# Patient Record
Sex: FEMALE | Race: WHITE | NOT HISPANIC OR LATINO | Employment: OTHER | URBAN - METROPOLITAN AREA
[De-identification: names, ages, dates, MRNs, and addresses within clinical notes are randomized per-mention and may not be internally consistent; named-entity substitution may affect disease eponyms.]

---

## 2018-02-23 ENCOUNTER — OFFICE VISIT CONVERTED (OUTPATIENT)
Dept: FAMILY MEDICINE CLINIC | Facility: CLINIC | Age: 53
End: 2018-02-23
Attending: FAMILY MEDICINE

## 2019-08-21 ENCOUNTER — HOSPITAL ENCOUNTER (OUTPATIENT)
Dept: URGENT CARE | Facility: CLINIC | Age: 54
Discharge: HOME | End: 2019-08-21
Attending: EMERGENCY MEDICINE

## 2021-05-16 VITALS
DIASTOLIC BLOOD PRESSURE: 56 MMHG | SYSTOLIC BLOOD PRESSURE: 109 MMHG | HEIGHT: 64 IN | BODY MASS INDEX: 26.64 KG/M2 | HEART RATE: 79 BPM | WEIGHT: 156.06 LBS | OXYGEN SATURATION: 98 % | TEMPERATURE: 97.5 F

## 2021-07-02 RX ORDER — LEVOTHYROXINE SODIUM 0.1 MG/1
TABLET ORAL
Qty: 30 TABLET | Refills: 2 | Status: SHIPPED | OUTPATIENT
Start: 2021-07-02 | End: 2021-10-05

## 2021-08-14 ENCOUNTER — PREP FOR SURGERY (OUTPATIENT)
Dept: OTHER | Facility: HOSPITAL | Age: 56
End: 2021-08-14

## 2021-08-14 ENCOUNTER — ANESTHESIA (OUTPATIENT)
Dept: PERIOP | Facility: HOSPITAL | Age: 56
End: 2021-08-14

## 2021-08-14 ENCOUNTER — APPOINTMENT (OUTPATIENT)
Dept: CT IMAGING | Facility: HOSPITAL | Age: 56
End: 2021-08-14

## 2021-08-14 ENCOUNTER — HOSPITAL ENCOUNTER (OUTPATIENT)
Facility: HOSPITAL | Age: 56
Discharge: HOME | End: 2021-08-15
Attending: EMERGENCY MEDICINE | Admitting: SURGERY

## 2021-08-14 ENCOUNTER — ANESTHESIA EVENT (OUTPATIENT)
Dept: PERIOP | Facility: HOSPITAL | Age: 56
End: 2021-08-14

## 2021-08-14 VITALS
TEMPERATURE: 97.7 F | RESPIRATION RATE: 16 BRPM | HEART RATE: 66 BPM | OXYGEN SATURATION: 93 % | WEIGHT: 155.65 LBS | HEIGHT: 64 IN | BODY MASS INDEX: 26.57 KG/M2 | SYSTOLIC BLOOD PRESSURE: 107 MMHG | DIASTOLIC BLOOD PRESSURE: 61 MMHG

## 2021-08-14 DIAGNOSIS — K35.30 ACUTE APPENDICITIS WITH LOCALIZED PERITONITIS, WITHOUT PERFORATION, ABSCESS, OR GANGRENE: Primary | ICD-10-CM

## 2021-08-14 DIAGNOSIS — K35.80 ACUTE APPENDICITIS, UNSPECIFIED ACUTE APPENDICITIS TYPE: Primary | ICD-10-CM

## 2021-08-14 LAB
ALBUMIN SERPL-MCNC: 4.8 G/DL (ref 3.5–5.2)
ALBUMIN/GLOB SERPL: 1.5 G/DL
ALP SERPL-CCNC: 66 U/L (ref 39–117)
ALT SERPL W P-5'-P-CCNC: 30 U/L (ref 1–33)
ANION GAP SERPL CALCULATED.3IONS-SCNC: 12.3 MMOL/L (ref 5–15)
AST SERPL-CCNC: 16 U/L (ref 1–32)
BACTERIA UR QL AUTO: ABNORMAL /HPF
BASOPHILS # BLD AUTO: 0.11 10*3/MM3 (ref 0–0.2)
BASOPHILS NFR BLD AUTO: 0.5 % (ref 0–1.5)
BILIRUB SERPL-MCNC: 0.4 MG/DL (ref 0–1.2)
BILIRUB UR QL STRIP: NEGATIVE
BUN SERPL-MCNC: 10 MG/DL (ref 6–20)
BUN/CREAT SERPL: 11.9 (ref 7–25)
CALCIUM SPEC-SCNC: 9.7 MG/DL (ref 8.6–10.5)
CHLORIDE SERPL-SCNC: 100 MMOL/L (ref 98–107)
CLARITY UR: CLEAR
CO2 SERPL-SCNC: 24.7 MMOL/L (ref 22–29)
COLOR UR: YELLOW
CREAT SERPL-MCNC: 0.84 MG/DL (ref 0.57–1)
DEPRECATED RDW RBC AUTO: 43.8 FL (ref 37–54)
EOSINOPHIL # BLD AUTO: 0.11 10*3/MM3 (ref 0–0.4)
EOSINOPHIL NFR BLD AUTO: 0.5 % (ref 0.3–6.2)
ERYTHROCYTE [DISTWIDTH] IN BLOOD BY AUTOMATED COUNT: 13.2 % (ref 12.3–15.4)
GFR SERPL CREATININE-BSD FRML MDRD: 70 ML/MIN/1.73
GLOBULIN UR ELPH-MCNC: 3.1 GM/DL
GLUCOSE SERPL-MCNC: 137 MG/DL (ref 65–99)
GLUCOSE UR STRIP-MCNC: NEGATIVE MG/DL
HCT VFR BLD AUTO: 46.1 % (ref 34–46.6)
HGB BLD-MCNC: 15.2 G/DL (ref 12–15.9)
HGB UR QL STRIP.AUTO: ABNORMAL
HOLD SPECIMEN: NORMAL
HOLD SPECIMEN: NORMAL
IMM GRANULOCYTES # BLD AUTO: 0.16 10*3/MM3 (ref 0–0.05)
IMM GRANULOCYTES NFR BLD AUTO: 0.7 % (ref 0–0.5)
KETONES UR QL STRIP: NEGATIVE
LEUKOCYTE ESTERASE UR QL STRIP.AUTO: NEGATIVE
LIPASE SERPL-CCNC: 20 U/L (ref 13–60)
LYMPHOCYTES # BLD AUTO: 2.16 10*3/MM3 (ref 0.7–3.1)
LYMPHOCYTES NFR BLD AUTO: 8.9 % (ref 19.6–45.3)
MCH RBC QN AUTO: 29.9 PG (ref 26.6–33)
MCHC RBC AUTO-ENTMCNC: 33 G/DL (ref 31.5–35.7)
MCV RBC AUTO: 90.6 FL (ref 79–97)
MONOCYTES # BLD AUTO: 1.74 10*3/MM3 (ref 0.1–0.9)
MONOCYTES NFR BLD AUTO: 7.2 % (ref 5–12)
MUCOUS THREADS URNS QL MICRO: ABNORMAL /HPF
NEUTROPHILS NFR BLD AUTO: 19.94 10*3/MM3 (ref 1.7–7)
NEUTROPHILS NFR BLD AUTO: 82.2 % (ref 42.7–76)
NITRITE UR QL STRIP: NEGATIVE
NRBC BLD AUTO-RTO: 0 /100 WBC (ref 0–0.2)
PH UR STRIP.AUTO: 5.5 [PH] (ref 5–8)
PLATELET # BLD AUTO: 301 10*3/MM3 (ref 140–450)
PMV BLD AUTO: 9.9 FL (ref 6–12)
POTASSIUM SERPL-SCNC: 4.5 MMOL/L (ref 3.5–5.2)
PROT SERPL-MCNC: 7.9 G/DL (ref 6–8.5)
PROT UR QL STRIP: ABNORMAL
RBC # BLD AUTO: 5.09 10*6/MM3 (ref 3.77–5.28)
RBC # UR: ABNORMAL /HPF
REF LAB TEST METHOD: ABNORMAL
SODIUM SERPL-SCNC: 137 MMOL/L (ref 136–145)
SP GR UR STRIP: 1.02 (ref 1–1.03)
SQUAMOUS #/AREA URNS HPF: ABNORMAL /HPF
UROBILINOGEN UR QL STRIP: ABNORMAL
WBC # BLD AUTO: 24.22 10*3/MM3 (ref 3.4–10.8)
WBC UR QL AUTO: ABNORMAL /HPF
WHOLE BLOOD HOLD SPECIMEN: NORMAL

## 2021-08-14 PROCEDURE — 85025 COMPLETE CBC W/AUTO DIFF WBC: CPT

## 2021-08-14 PROCEDURE — 99203 OFFICE O/P NEW LOW 30 MIN: CPT | Performed by: SURGERY

## 2021-08-14 PROCEDURE — 96365 THER/PROPH/DIAG IV INF INIT: CPT

## 2021-08-14 PROCEDURE — 88304 TISSUE EXAM BY PATHOLOGIST: CPT | Performed by: SURGERY

## 2021-08-14 PROCEDURE — 25010000002 PROPOFOL 10 MG/ML EMULSION: Performed by: NURSE ANESTHETIST, CERTIFIED REGISTERED

## 2021-08-14 PROCEDURE — 25010000002 DEXAMETHASONE PER 1 MG: Performed by: NURSE ANESTHETIST, CERTIFIED REGISTERED

## 2021-08-14 PROCEDURE — C1889 IMPLANT/INSERT DEVICE, NOC: HCPCS | Performed by: SURGERY

## 2021-08-14 PROCEDURE — 83690 ASSAY OF LIPASE: CPT

## 2021-08-14 PROCEDURE — 25010000002 KETOROLAC TROMETHAMINE PER 15 MG: Performed by: NURSE ANESTHETIST, CERTIFIED REGISTERED

## 2021-08-14 PROCEDURE — 36415 COLL VENOUS BLD VENIPUNCTURE: CPT

## 2021-08-14 PROCEDURE — 25010000002 PIPERACILLIN SOD-TAZOBACTAM PER 1 G: Performed by: NURSE PRACTITIONER

## 2021-08-14 PROCEDURE — 25010000002 ONDANSETRON PER 1 MG: Performed by: NURSE PRACTITIONER

## 2021-08-14 PROCEDURE — 25010000002 KETOROLAC TROMETHAMINE PER 15 MG: Performed by: NURSE PRACTITIONER

## 2021-08-14 PROCEDURE — 74176 CT ABD & PELVIS W/O CONTRAST: CPT

## 2021-08-14 PROCEDURE — 25010000002 ONDANSETRON PER 1 MG: Performed by: NURSE ANESTHETIST, CERTIFIED REGISTERED

## 2021-08-14 PROCEDURE — 96375 TX/PRO/DX INJ NEW DRUG ADDON: CPT

## 2021-08-14 PROCEDURE — 25010000002 DEXAMETHASONE PER 1 MG

## 2021-08-14 PROCEDURE — 99284 EMERGENCY DEPT VISIT MOD MDM: CPT

## 2021-08-14 PROCEDURE — 25010000002 FENTANYL CITRATE (PF) 50 MCG/ML SOLUTION: Performed by: NURSE ANESTHETIST, CERTIFIED REGISTERED

## 2021-08-14 PROCEDURE — 81001 URINALYSIS AUTO W/SCOPE: CPT

## 2021-08-14 PROCEDURE — 80053 COMPREHEN METABOLIC PANEL: CPT

## 2021-08-14 PROCEDURE — 25010000002 BUPRENORPHINE PER 0.1 MG

## 2021-08-14 PROCEDURE — 44970 LAPAROSCOPY APPENDECTOMY: CPT | Performed by: SURGERY

## 2021-08-14 DEVICE — ENDOPATH ECHELON ENDOSCOPIC LINEAR CUTTER RELOADS, BLUE, 60MM
Type: IMPLANTABLE DEVICE | Site: ABDOMEN | Status: FUNCTIONAL
Brand: ECHELON ENDOPATH

## 2021-08-14 RX ORDER — POLYETHYLENE GLYCOL 3350 17 G/17G
17 POWDER, FOR SOLUTION ORAL DAILY
Qty: 5 PACKET | Refills: 0 | Status: SHIPPED | OUTPATIENT
Start: 2021-08-14 | End: 2022-05-10

## 2021-08-14 RX ORDER — ONDANSETRON 2 MG/ML
4 INJECTION INTRAMUSCULAR; INTRAVENOUS ONCE AS NEEDED
Status: DISCONTINUED | OUTPATIENT
Start: 2021-08-14 | End: 2021-08-15 | Stop reason: HOSPADM

## 2021-08-14 RX ORDER — ROCURONIUM BROMIDE 10 MG/ML
INJECTION, SOLUTION INTRAVENOUS AS NEEDED
Status: DISCONTINUED | OUTPATIENT
Start: 2021-08-14 | End: 2021-08-14 | Stop reason: SURG

## 2021-08-14 RX ORDER — KETOROLAC TROMETHAMINE 30 MG/ML
30 INJECTION, SOLUTION INTRAMUSCULAR; INTRAVENOUS ONCE
Status: COMPLETED | OUTPATIENT
Start: 2021-08-14 | End: 2021-08-14

## 2021-08-14 RX ORDER — PROMETHAZINE HYDROCHLORIDE 25 MG/1
25 SUPPOSITORY RECTAL ONCE AS NEEDED
Status: DISCONTINUED | OUTPATIENT
Start: 2021-08-14 | End: 2021-08-15 | Stop reason: HOSPADM

## 2021-08-14 RX ORDER — ACETAMINOPHEN 325 MG/1
650 TABLET ORAL EVERY 6 HOURS PRN
Status: DISCONTINUED | OUTPATIENT
Start: 2021-08-14 | End: 2021-08-15 | Stop reason: HOSPADM

## 2021-08-14 RX ORDER — MEPERIDINE HYDROCHLORIDE 25 MG/ML
12.5 INJECTION INTRAMUSCULAR; INTRAVENOUS; SUBCUTANEOUS
Status: DISCONTINUED | OUTPATIENT
Start: 2021-08-14 | End: 2021-08-15 | Stop reason: HOSPADM

## 2021-08-14 RX ORDER — PHENYLEPHRINE HCL IN 0.9% NACL 1 MG/10 ML
SYRINGE (ML) INTRAVENOUS AS NEEDED
Status: DISCONTINUED | OUTPATIENT
Start: 2021-08-14 | End: 2021-08-14 | Stop reason: SURG

## 2021-08-14 RX ORDER — KETOROLAC TROMETHAMINE 15 MG/ML
10 INJECTION, SOLUTION INTRAMUSCULAR; INTRAVENOUS ONCE
Status: DISCONTINUED | OUTPATIENT
Start: 2021-08-14 | End: 2021-08-14

## 2021-08-14 RX ORDER — PROMETHAZINE HYDROCHLORIDE 25 MG/1
25 TABLET ORAL ONCE AS NEEDED
Status: DISCONTINUED | OUTPATIENT
Start: 2021-08-14 | End: 2021-08-15 | Stop reason: HOSPADM

## 2021-08-14 RX ORDER — SODIUM CHLORIDE 0.9 % (FLUSH) 0.9 %
10 SYRINGE (ML) INJECTION AS NEEDED
Status: DISCONTINUED | OUTPATIENT
Start: 2021-08-14 | End: 2021-08-15 | Stop reason: HOSPADM

## 2021-08-14 RX ORDER — SODIUM CHLORIDE 0.9 % (FLUSH) 0.9 %
10 SYRINGE (ML) INJECTION EVERY 12 HOURS SCHEDULED
Status: DISCONTINUED | OUTPATIENT
Start: 2021-08-14 | End: 2021-08-15 | Stop reason: HOSPADM

## 2021-08-14 RX ORDER — OXYCODONE HYDROCHLORIDE AND ACETAMINOPHEN 5; 325 MG/1; MG/1
1 TABLET ORAL EVERY 6 HOURS PRN
Qty: 12 TABLET | Refills: 0 | Status: SHIPPED | OUTPATIENT
Start: 2021-08-14 | End: 2022-05-10

## 2021-08-14 RX ORDER — ONDANSETRON 2 MG/ML
4 INJECTION INTRAMUSCULAR; INTRAVENOUS ONCE
Status: COMPLETED | OUTPATIENT
Start: 2021-08-14 | End: 2021-08-14

## 2021-08-14 RX ORDER — LIDOCAINE HYDROCHLORIDE 20 MG/ML
INJECTION, SOLUTION INFILTRATION; PERINEURAL AS NEEDED
Status: DISCONTINUED | OUTPATIENT
Start: 2021-08-14 | End: 2021-08-14 | Stop reason: SURG

## 2021-08-14 RX ORDER — KETOROLAC TROMETHAMINE 30 MG/ML
INJECTION, SOLUTION INTRAMUSCULAR; INTRAVENOUS AS NEEDED
Status: DISCONTINUED | OUTPATIENT
Start: 2021-08-14 | End: 2021-08-14 | Stop reason: SURG

## 2021-08-14 RX ORDER — ONDANSETRON 2 MG/ML
INJECTION INTRAMUSCULAR; INTRAVENOUS AS NEEDED
Status: DISCONTINUED | OUTPATIENT
Start: 2021-08-14 | End: 2021-08-14 | Stop reason: SURG

## 2021-08-14 RX ORDER — DEXAMETHASONE SODIUM PHOSPHATE 4 MG/ML
INJECTION, SOLUTION INTRA-ARTICULAR; INTRALESIONAL; INTRAMUSCULAR; INTRAVENOUS; SOFT TISSUE AS NEEDED
Status: DISCONTINUED | OUTPATIENT
Start: 2021-08-14 | End: 2021-08-14 | Stop reason: SURG

## 2021-08-14 RX ORDER — PROPOFOL 10 MG/ML
VIAL (ML) INTRAVENOUS AS NEEDED
Status: DISCONTINUED | OUTPATIENT
Start: 2021-08-14 | End: 2021-08-14 | Stop reason: SURG

## 2021-08-14 RX ORDER — OXYCODONE HYDROCHLORIDE 5 MG/1
5 TABLET ORAL
Status: DISCONTINUED | OUTPATIENT
Start: 2021-08-14 | End: 2021-08-15 | Stop reason: HOSPADM

## 2021-08-14 RX ORDER — SODIUM CHLORIDE, SODIUM LACTATE, POTASSIUM CHLORIDE, CALCIUM CHLORIDE 600; 310; 30; 20 MG/100ML; MG/100ML; MG/100ML; MG/100ML
50 INJECTION, SOLUTION INTRAVENOUS CONTINUOUS
Status: DISCONTINUED | OUTPATIENT
Start: 2021-08-14 | End: 2021-08-15 | Stop reason: HOSPADM

## 2021-08-14 RX ORDER — FENTANYL CITRATE 50 UG/ML
INJECTION, SOLUTION INTRAMUSCULAR; INTRAVENOUS AS NEEDED
Status: DISCONTINUED | OUTPATIENT
Start: 2021-08-14 | End: 2021-08-14 | Stop reason: SURG

## 2021-08-14 RX ADMIN — SUGAMMADEX 150 MG: 100 INJECTION, SOLUTION INTRAVENOUS at 22:27

## 2021-08-14 RX ADMIN — TAZOBACTAM SODIUM AND PIPERACILLIN SODIUM 3.38 G: 375; 3 INJECTION, SOLUTION INTRAVENOUS at 20:25

## 2021-08-14 RX ADMIN — SODIUM CHLORIDE, POTASSIUM CHLORIDE, SODIUM LACTATE AND CALCIUM CHLORIDE: 600; 310; 30; 20 INJECTION, SOLUTION INTRAVENOUS at 21:39

## 2021-08-14 RX ADMIN — ROCURONIUM BROMIDE 50 MG: 10 INJECTION INTRAVENOUS at 21:40

## 2021-08-14 RX ADMIN — KETOROLAC TROMETHAMINE 30 MG: 30 INJECTION, SOLUTION INTRAMUSCULAR; INTRAVENOUS at 17:35

## 2021-08-14 RX ADMIN — LIDOCAINE HYDROCHLORIDE 50 MG: 20 INJECTION, SOLUTION INFILTRATION; PERINEURAL at 21:40

## 2021-08-14 RX ADMIN — Medication 100 MCG: at 21:56

## 2021-08-14 RX ADMIN — DEXAMETHASONE SODIUM PHOSPHATE 4 MG: 4 INJECTION INTRA-ARTICULAR; INTRALESIONAL; INTRAMUSCULAR; INTRAVENOUS; SOFT TISSUE at 21:40

## 2021-08-14 RX ADMIN — KETOROLAC TROMETHAMINE 30 MG: 30 INJECTION, SOLUTION INTRAMUSCULAR; INTRAVENOUS at 22:24

## 2021-08-14 RX ADMIN — ONDANSETRON 4 MG: 2 INJECTION INTRAMUSCULAR; INTRAVENOUS at 22:24

## 2021-08-14 RX ADMIN — SODIUM CHLORIDE 1000 ML: 9 INJECTION, SOLUTION INTRAVENOUS at 19:48

## 2021-08-14 RX ADMIN — PROPOFOL 150 MG: 10 INJECTION, EMULSION INTRAVENOUS at 21:40

## 2021-08-14 RX ADMIN — FENTANYL CITRATE 100 MCG: 50 INJECTION INTRAMUSCULAR; INTRAVENOUS at 21:40

## 2021-08-14 RX ADMIN — ONDANSETRON 4 MG: 2 INJECTION INTRAMUSCULAR; INTRAVENOUS at 17:35

## 2021-08-14 RX ADMIN — ACETAMINOPHEN 650 MG: 325 TABLET ORAL at 19:46

## 2021-08-14 NOTE — ED PROVIDER NOTES
Subjective   Patient reports right lower quadrant abdominal pain, nausea, vomiting, and one episode of diarrhea that started last night and has persisted throughout today. She is unable to eat and has only been able to drink a small amount of water today.      History provided by:  Patient, spouse and relative   used: No        Review of Systems   Constitutional: Positive for appetite change. Negative for chills and fever.   HENT: Negative for congestion, ear pain and sore throat.    Eyes: Negative for pain.   Respiratory: Negative for cough, chest tightness and shortness of breath.    Cardiovascular: Negative for chest pain.   Gastrointestinal: Positive for abdominal pain (RLQ), diarrhea, nausea and vomiting.   Genitourinary: Negative for flank pain and hematuria.   Musculoskeletal: Negative for joint swelling.   Skin: Negative for pallor.   Neurological: Negative for seizures and headaches.   All other systems reviewed and are negative.      Past Medical History:   Diagnosis Date   • Disease of thyroid gland        No Known Allergies    Past Surgical History:   Procedure Laterality Date   • CHOLECYSTECTOMY     • TUBAL ABDOMINAL LIGATION         History reviewed. No pertinent family history.    Social History     Socioeconomic History   • Marital status:      Spouse name: Not on file   • Number of children: Not on file   • Years of education: Not on file   • Highest education level: Not on file   Tobacco Use   • Smoking status: Current Every Day Smoker     Packs/day: 1.00     Types: Cigarettes   • Smokeless tobacco: Never Used   Substance and Sexual Activity   • Alcohol use: Never   • Drug use: Never   • Sexual activity: Defer           Objective   Physical Exam  Vitals and nursing note reviewed.   Constitutional:       General: She is not in acute distress.     Appearance: Normal appearance. She is well-developed and normal weight. She is ill-appearing. She is not toxic-appearing or  diaphoretic.   HENT:      Head: Normocephalic and atraumatic.   Eyes:      Pupils: Pupils are equal, round, and reactive to light.   Cardiovascular:      Rate and Rhythm: Normal rate and regular rhythm.      Heart sounds: Normal heart sounds.   Pulmonary:      Effort: Pulmonary effort is normal. No respiratory distress.      Breath sounds: Normal breath sounds.   Abdominal:      General: Abdomen is flat. Bowel sounds are increased. There is no distension. There are no signs of injury.      Palpations: Abdomen is soft.      Tenderness: There is abdominal tenderness in the right lower quadrant. There is rebound.   Musculoskeletal:         General: No swelling, tenderness or signs of injury. Normal range of motion.      Cervical back: Normal range of motion and neck supple.   Skin:     General: Skin is warm and dry.      Capillary Refill: Capillary refill takes less than 2 seconds.      Findings: No rash.   Neurological:      General: No focal deficit present.      Mental Status: She is alert and oriented to person, place, and time. Mental status is at baseline.      Motor: No weakness.   Psychiatric:         Mood and Affect: Mood normal.         Behavior: Behavior normal.         Thought Content: Thought content normal.         Judgment: Judgment normal.         Procedures           ED Course  ED Course as of Aug 15 1258   Sat Aug 14, 2021   1627 RDW: 13.2 [MH]      ED Course User Index  [MH] Marilia Stewart, JOHN                                           MDM  Number of Diagnoses or Management Options  Acute appendicitis with localized peritonitis, without perforation, abscess, or gangrene: new and requires workup     Amount and/or Complexity of Data Reviewed  Clinical lab tests: reviewed  Tests in the radiology section of CPT®: reviewed    Patient Progress  Patient progress: stable      Final diagnoses:   Acute appendicitis with localized peritonitis, without perforation, abscess, or gangrene       ED Disposition  ED  Disposition     ED Disposition Condition Comment    Send to OR            Antoine Dodd MD  [address]  [address]  [facility] [address]  [phone]               Medication List      New Prescriptions    oxyCODONE-acetaminophen 5-325 MG per tablet  Commonly known as: Percocet  Take 1 tablet by mouth Every 6 (Six) Hours As Needed for Moderate Pain .     polyethylene glycol 17 g packet  Commonly known as: MIRALAX  Take 17 g by mouth Daily.           Where to Get Your Medications      These medications were sent to [facility] [address] [place] [address], [place] - [address] [facility] [address] [facility] [address] - [phone]  - [phone]   [address] [facility] [address]    Phone: [phone]   · oxyCODONE-acetaminophen 5-325 MG per tablet  · polyethylene glycol 17 g packet          Marilia Stewart, JOHN  08/15/21 1930

## 2021-08-15 NOTE — OP NOTE
OP NOTE  APPENDECTOMY LAPAROSCOPIC POSSIBLE OPEN  Procedure Report    Patient Name:  Aida Puga  YOB: 1965  [id number]    Date of Surgery:  8/14/2021     Indications: See consult note for indications, discussion of risk benefits and alternatives    Pre-op Diagnosis:   Acute appendicitis with localized peritonitis, without perforation, abscess, or gangrene [K35.30]       Post-Op Diagnosis Codes:     * Acute appendicitis with localized peritonitis, without perforation, abscess, or gangrene [K35.30]    Procedure/CPT® Codes:      Procedure(s): Laparoscopic appendectomy    Staff:  Surgeon(s):  Ilan Fajardo MD    Assistant: Humera Em RN    Anesthesia: General, Local    Estimated Blood Loss: 10 mL    Implants:    Implant Name Type Inv. Item Serial No.  Lot No. LRB No. Used Action   RELOAD ECHELON FLEX GST REG 3.6MM AICHA - [id number] Implant RELOAD ECHELON FLEX GST REG 3.6MM AICHA  [employer]  DIV OF J [employer] J [id number] N/A 1 Implanted       Specimen:          Specimens     ID Source Type Tests Collected By Collected At Frozen?    A Large Intestine, Appendix Tissue · TISSUE PATHOLOGY EXAM   Ilan Fajardo MD 8/14/21 2224 No    Description: appendix            Findings: Inflamed appendix consistent with appendicitis without evidence of perforation or abscess.  Normal terminal ileum.    Complications: None    Description of Procedure: After all questions were answered, consent was verified.  She was brought the operating per stretcher placed in supine position arms out all extremities padded.  Bilateral lower extremity SCDs placed.  General tracheal anesthesia induced.  Preoperative IV antibiotics been given.  Patient's left upper extremity padded and tucked appropriately.  Patient's abdomen was prepped with ChloraPrep.  We waited 3 minutes.  We draped in usual sterile fashion.  Ioban applied.  Critical timeout taken.  Began the procedure with midline incision  above the umbilicus.  I entered the abdomen sharply under direct vision without injury to viscera below.  I placed a 12 balloon trocar.  Obtain pneumoperitoneum with CO2 insufflation.  I placed the patient in Trendelenburg and rotated to the left.  I then placed a 5 trocar in the lower midline and left mid quadrant under direct vision.  The terminal ileum was normal.  Identified the cecum.  The appendix was inflamed consistent with appendicitis without evidence of perforation or abscess.  I made an opening in the avascular plane of the mesoappendix at its junction with the cecum.  I divided the appendix at its junction with the cecum with a laparoscopic MELINA stapler blue load.  Division hemostatic.  I divided the mesoappendix with the LigaSure device.  The appendix was placed in a laparoscopic retrieval device.  I inspected the divided mesoappendix with hemostasis verified.  I suctioned the right lower quadrant.  I placed omentum in the right lower quadrant.  I infiltrated bilateral upper quadrants and a tap block fashion with local anesthesia.  I then desufflated the abdomen and removed the trochars and remove the specimen bag.  It was sent to pathology for permanent.  I closed the umbilical fascia with Vicryl.  I closed incisions with Vicryl Monocryl and skin glue.  At the end of the procedure all counts were correct.  I was present for the procedure.    Ilan Fajardo MD     Date: 8/14/2021  Time: 22:36 EDT

## 2021-08-15 NOTE — ANESTHESIA PREPROCEDURE EVALUATION
Anesthesia Evaluation     Patient summary reviewed and Nursing notes reviewed   history of anesthetic complications:  NPO Solid Status: > 8 hours  NPO Liquid Status: > 6 hours           Airway   Mallampati: II  TM distance: >3 FB  Neck ROM: full  Dental    (+) upper dentures    Pulmonary    (+) a smoker Current Abstained day of surgery, shortness of breath, rhonchi,   Cardiovascular   Exercise tolerance: good (4-7 METS)        Neuro/Psych- negative ROS  GI/Hepatic/Renal/Endo    (+)   thyroid problem hypothyroidism    Musculoskeletal (-) negative ROS    Abdominal     Abdomen: soft.   Substance History - negative use     OB/GYN negative ob/gyn ROS         Other - negative ROS                       Anesthesia Plan    ASA 2 - emergent     general     intravenous induction     Anesthetic plan, all risks, benefits, and alternatives have been provided, discussed and informed consent has been obtained with: patient and spouse/significant other.

## 2021-08-15 NOTE — ANESTHESIA POSTPROCEDURE EVALUATION
Patient: Aida Puga    Procedure Summary     Date: 08/14/21 Room / Location: [facility] OR 05 / [facility] MAIN OR    Anesthesia Start: 2138 Anesthesia Stop: 2243    Procedure: APPENDECTOMY LAPAROSCOPIC POSSIBLE OPEN (N/A Abdomen) Diagnosis:       Acute appendicitis with localized peritonitis, without perforation, abscess, or gangrene      (Acute appendicitis with localized peritonitis, without perforation, abscess, or gangrene [K35.30])    Surgeons: Ilan Fajardo MD Provider: Jonatan Hebert MD    Anesthesia Type: general ASA Status: 2 - Emergent          Anesthesia Type: general    Vitals  Vitals Value Taken Time   /52 08/14/21 2245   Temp 36.3 °C (97.4 °F) 08/14/21 2238   Pulse 84 08/14/21 2246   Resp 18 08/14/21 2238   SpO2 95 % 08/14/21 2246   Vitals shown include unvalidated device data.        Post Anesthesia Care and Evaluation    Patient location during evaluation: bedside  Patient participation: complete - patient participated  Level of consciousness: awake  Pain management: adequate  Airway patency: patent  Anesthetic complications: No anesthetic complications  PONV Status: none  Cardiovascular status: acceptable and stable  Respiratory status: acceptable and room air  Hydration status: acceptable    Comments: An Anesthesiologist personally participated in the most demanding procedures (including induction and emergence if applicable) in the anesthesia plan, monitored the course of anesthesia administration at frequent intervals and remained physically present and available for immediate diagnosis and treatment of emergencies.

## 2021-08-15 NOTE — CONSULTS
General Surgery/Colorectal Surgery Note    Patient Name:  Aida Puga  YOB: 1965  [id number]    Referring Provider: No ref. provider found      Patient Care Team:  Antoine Dodd MD as PCP - General (Family Medicine)    Chief complaint abdominal pain    Subjective .     History of present illness:    Started with nausea yesterday awakening this morning with sharp 10 out of 10 intermittent right lower quadrant abdominal pain worse with movement and deep breathing.  No nausea vomiting.  No fever.  No history of the same.  No alleviating factors.  She came to the emergency department for further evaluation.  Found to have WBC of 24.  Creatinine 0.8.  UA with 2+ blood.  CT abdomen pelvis with images personally reviewed with acute appendicitis without evidence of perforation or abscess, left renal stone, small hiatal hernia.  Patient has been given Zosyn.  She has a history of cholecystectomy and tubal ligation.  Positive tobacco abuse.      History:  Past Medical History:   Diagnosis Date   • Disease of thyroid gland        Past Surgical History:   Procedure Laterality Date   • CHOLECYSTECTOMY     • TUBAL ABDOMINAL LIGATION         History reviewed. No pertinent family history.    Social History     Tobacco Use   • Smoking status: Current Every Day Smoker     Packs/day: 1.00     Types: Cigarettes   • Smokeless tobacco: Never Used   Substance Use Topics   • Alcohol use: Never   • Drug use: Never       Review of Systems  All systems were reviewed and negative except for:   Review of Systems   Constitutional: Negative for chills, fever and unexpected weight loss.   HENT: Negative for congestion, nosebleeds and voice change.    Eyes: Negative for blurred vision, double vision and discharge.   Respiratory: Negative for apnea, chest tightness and shortness of breath.    Cardiovascular: Negative for chest pain and leg swelling.   Gastrointestinal:        See HPI   Endocrine: Negative for cold  intolerance and heat intolerance.   Genitourinary: Negative for dysuria, hematuria and urgency.   Musculoskeletal: Negative for back pain, joint swelling and neck pain.   Skin: Negative for color change and dry skin.   Neurological: Negative for dizziness and confusion.   Hematological: Negative for adenopathy.   Psychiatric/Behavioral: Negative for agitation and behavioral problems.     MEDS:  Prior to Admission medications    Medication Sig Start Date End Date Taking? Authorizing Provider   levothyroxine (SYNTHROID, LEVOTHROID) 100 MCG tablet TAKE ONE TABLET BY MOUTH DAILY 7/2/21   Antoine Dodd MD        Nerve Block, 30.7 mL, Injection, Once  sodium chloride, 10 mL, Intravenous, Q12H         lactated ringers, 50 mL/hr         Allergies:  Patient has no known allergies.    Objective     Vital Signs   Temp:  [98.1 °F (36.7 °C)-98.7 °F (37.1 °C)] 98.1 °F (36.7 °C)  Heart Rate:  [74-88] 87  Resp:  [16-20] 16  BP: (102-130)/() 102/58    Physical Exam:     General Appearance:    Alert, cooperative, in no acute distress   Head:    Normocephalic, without obvious abnormality, atraumatic   Eyes:          Conjunctivae and sclerae normal, no icterus,     Ears:    Ears appear intact with no abnormalities noted   Throat:   No oral lesions, no thrush, oral mucosa moist   Neck:   No adenopathy, supple, trachea midline, no thyromegaly   Back:     No kyphosis present, no scoliosis present, no skin lesions,      erythema or scars, no tenderness to percussion or                   palpation,   range of motion normal   Lungs:     Clear to auscultation,respirations regular, even and                  unlabored    Heart:    Regular rhythm and normal rate, normal S1 and S2, no            murmur, no gallop, no rub, no click   Chest Wall:    No abnormalities observed   Abdomen:     Normal bowel sounds, no masses, no organomegaly, soft        moderate tenderness right lower quadrant, non-distended, no guarding, no rebound tenderness    Rectal:        Extremities:   Moves all extremities well, no edema, no cyanosis, no             redness   Pulses:   Pulses palpable and equal bilaterally   Skin:   No bleeding, bruising or rash   Lymph nodes:   No palpable adenopathy   Neurologic:   A/o x 4 with no deficits       Results Review: I have reviewed the patient's labs and imaging    LABS/IMAGING:    Imaging Results (Last 72 Hours)     Procedure Component Value Units Date/Time    CT Abdomen Pelvis Without Contrast [[id number]] Collected: 08/14/21 1802     Updated: 08/14/21 1806    Narrative:      PROCEDURE: CT ABDOMEN PELVIS WO CONTRAST     COMPARISON: None     INDICATIONS: Flank pain, kidney stone suspected     TECHNIQUE: CT images were created without intravenous contrast.       PROTOCOL:   Standard imaging protocol performed      RADIATION:   DLP: 398.6 mGy*cm    Automated exposure control was utilized to minimize radiation dose.      FINDINGS:   The heart size is normal.  There is no pericardial effusion.  The lung bases are clear.     The liver is diffusely hypodense compatible with underlying hepatic steatosis.  The gallbladder   surgically absent.  There is no intrahepatic or extrahepatic biliary ductal dilatation.  The   spleen, adrenal glands, and pancreas appear within normal limits.     There is a 3 mm nonobstructing stone within the left kidney.  There is no hydronephrosis or   hydroureter.  The urinary bladder is fluid filled without wall thickening.  The uterus is present   and anteverted.  There are no adnexal masses.     There is a small hiatal hernia.  The stomach and duodenum are normal in caliber and configuration.    There are no abnormally dilated loops of small bowel to suggest small bowel obstruction or small   bowel inflammation.  There is acute appendicitis within the right lower quadrant.  There is   significant inflammatory stranding and some unorganized free fluid.  There is no evidence of free   air to suggest perforation.   There is no evidence of abscess formation.  There is no acute colitis   or diverticulitis.     The aorta is normal in caliber without evidence of aneurysm formation.  There is no mesenteric,   retroperitoneal, or pelvic adenopathy by size criteria.  There are degenerative changes of the   lumbar spine.     CONCLUSION:   1. Acute appendicitis without evidence of perforation or abscess.  2. Nonobstructing 3 mm left renal stone.  3. Small hiatal hernia.    4. Diffuse hepatic steatosis.              BARRY SCHROEDER MD         Electronically Signed and Approved By: BARRY SCHROEDER MD on 8/14/2021 at 18:03                            Lab Results (last 72 hours)     Procedure Component Value Units Date/Time    [place] Draw [[id number]] Collected: 08/14/21 1334    Specimen: Blood Updated: 08/14/21 1433    Narrative:      The following orders were created for panel order [place] Draw.  Procedure                               Abnormality         Status                     ---------                               -----------         ------                     Green Top (Gel)[[id number]]                                  Final result               Lavender Top[[id number]]                                     Final result               Gold Top - SST[[id number]]                                   Final result                 Please view results for these tests on the individual orders.    Lavender Top [[id number]] Collected: 08/14/21 1334    Specimen: Blood Updated: 08/14/21 1433     Extra Tube hold for add-on     Comment: Auto resulted       Gold Top - SST [[id number]] Collected: 08/14/21 1334    Specimen: Blood Updated: 08/14/21 1433     Extra Tube Hold for add-ons.     Comment: Auto resulted.       Green Top (Gel) [[id number]] Collected: 08/14/21 1334    Specimen: Blood Updated: 08/14/21 1432     Extra Tube Hold for add-ons.     Comment: Auto resulted.       Comprehensive Metabolic Panel [[id number]]  (Abnormal) Collected: 08/14/21 1334     Specimen: Blood Updated: 08/14/21 1414     Glucose 137 mg/dL      BUN 10 mg/dL      Creatinine 0.84 mg/dL      Sodium 137 mmol/L      Potassium 4.5 mmol/L      Chloride 100 mmol/L      CO2 24.7 mmol/L      Calcium 9.7 mg/dL      Total Protein 7.9 g/dL      Albumin 4.80 g/dL      ALT (SGPT) 30 U/L      AST (SGOT) 16 U/L      Alkaline Phosphatase 66 U/L      Total Bilirubin 0.4 mg/dL      eGFR Non African Amer 70 mL/min/1.73      Globulin 3.1 gm/dL      A/G Ratio 1.5 g/dL      BUN/Creatinine Ratio 11.9     Anion Gap 12.3 mmol/L     Narrative:      GFR Normal >60  Chronic Kidney Disease <60  Kidney Failure <15      Lipase [[id number]]  (Normal) Collected: 08/14/21 1334    Specimen: Blood Updated: 08/14/21 1414     Lipase 20 U/L     Urinalysis With Microscopic If Indicated (No Culture) - [[id number]]  (Abnormal) Collected: 08/14/21 1334    Specimen: Urine Updated: 08/14/21 1413     Color, UA Yellow     Appearance, UA Clear     pH, UA 5.5     Specific Gravity, UA 1.022     Glucose, UA Negative     Ketones, UA Negative     Bilirubin, UA Negative     Blood, UA Moderate (2+)     Protein, UA Trace     Leuk Esterase, UA Negative     Nitrite, UA Negative     Urobilinogen, UA 0.2 E.U./dL    Urinalysis, Microscopic Only - Urine, Clean Catch [[id number]]  (Abnormal) Collected: 08/14/21 1334    Specimen: Urine Updated: 08/14/21 1413     RBC, UA 13-20 /HPF      WBC, UA None Seen /HPF      Bacteria, UA 2+ /HPF      Squamous Epithelial Cells, UA 0-2 /HPF      Mucus, UA Moderate/2+ /HPF      Methodology Manual Light Microscopy    CBC & Differential [[id number]]  (Abnormal) Collected: 08/14/21 1334    Specimen: Blood Updated: 08/14/21 1344    Narrative:      The following orders were created for panel order CBC & Differential.  Procedure                               Abnormality         Status                     ---------                               -----------         ------                     CBC Auto Differential[[id number]]         Abnormal            Final result                 Please view results for these tests on the individual orders.    CBC Auto Differential [[id number]]  (Abnormal) Collected: 08/14/21 1334    Specimen: Blood Updated: 08/14/21 1344     WBC 24.22 10*3/mm3      RBC 5.09 10*6/mm3      Hemoglobin 15.2 g/dL      Hematocrit 46.1 %      MCV 90.6 fL      MCH 29.9 pg      MCHC 33.0 g/dL      RDW 13.2 %      RDW-SD 43.8 fl      MPV 9.9 fL      Platelets 301 10*3/mm3      Neutrophil % 82.2 %      Lymphocyte % 8.9 %      Monocyte % 7.2 %      Eosinophil % 0.5 %      Basophil % 0.5 %      Immature Grans % 0.7 %      Neutrophils, Absolute 19.94 10*3/mm3      Lymphocytes, Absolute 2.16 10*3/mm3      Monocytes, Absolute 1.74 10*3/mm3      Eosinophils, Absolute 0.11 10*3/mm3      Basophils, Absolute 0.11 10*3/mm3      Immature Grans, Absolute 0.16 10*3/mm3      nRBC 0.0 /100 WBC              Result Review :     Assessment/Plan     Acute appendicitis with localized peritonitis, without perforation, abscess, or gangrene  Leukocytosis  Tobacco abuse    I have reviewed the patient's work-up including labs and imaging with the results mentioned above.  CT abdomen and pelvis with images personally reviewed with the patient.  I recommended laparoscopic possible open appendectomy.  I explained the procedure and recovery.  Benefits and alternatives discussed.  Risk procedure including risk of anesthesia, bleeding, infection, conversion open, possible bowel resection, hernia, heart attack, stroke, blood clot, pneumonia, damage to surrounding structures were discussed.  All questions answered.  Consent obtained.  Continue NPO.    Discharge instructions given.  Follow-up with me in 2 weeks.  Call for concern for infection.  No heavy lifting for 2 weeks.  No working or driving on pain medication.  All questions answered.    Smoking cessation counseling performed.    Aida Puga  reports that she has been smoking cigarettes. She has been  smoking about 1.00 pack per day. She has never used smokeless tobacco.. I have educated her on the risk of diseases from using tobacco products such as negative impact smoking has on her health.    I advised her to quit and she is not willing to quit.    I spent 3  minutes counseling the patient.            Ilan Fajardo MD  08/14/21 21:10 EDT

## 2021-08-15 NOTE — H&P (VIEW-ONLY)
General Surgery/Colorectal Surgery Note    Patient Name:  Aida Puga  YOB: 1965  [id number]    Referring Provider: No ref. provider found      Patient Care Team:  Antoine Dodd MD as PCP - General (Family Medicine)    Chief complaint abdominal pain    Subjective .     History of present illness:    Started with nausea yesterday awakening this morning with sharp 10 out of 10 intermittent right lower quadrant abdominal pain worse with movement and deep breathing.  No nausea vomiting.  No fever.  No history of the same.  No alleviating factors.  She came to the emergency department for further evaluation.  Found to have WBC of 24.  Creatinine 0.8.  UA with 2+ blood.  CT abdomen pelvis with images personally reviewed with acute appendicitis without evidence of perforation or abscess, left renal stone, small hiatal hernia.  Patient has been given Zosyn.  She has a history of cholecystectomy and tubal ligation.  Positive tobacco abuse.      History:  Past Medical History:   Diagnosis Date   • Disease of thyroid gland        Past Surgical History:   Procedure Laterality Date   • CHOLECYSTECTOMY     • TUBAL ABDOMINAL LIGATION         History reviewed. No pertinent family history.    Social History     Tobacco Use   • Smoking status: Current Every Day Smoker     Packs/day: 1.00     Types: Cigarettes   • Smokeless tobacco: Never Used   Substance Use Topics   • Alcohol use: Never   • Drug use: Never       Review of Systems  All systems were reviewed and negative except for:   Review of Systems   Constitutional: Negative for chills, fever and unexpected weight loss.   HENT: Negative for congestion, nosebleeds and voice change.    Eyes: Negative for blurred vision, double vision and discharge.   Respiratory: Negative for apnea, chest tightness and shortness of breath.    Cardiovascular: Negative for chest pain and leg swelling.   Gastrointestinal:        See HPI   Endocrine: Negative for cold  intolerance and heat intolerance.   Genitourinary: Negative for dysuria, hematuria and urgency.   Musculoskeletal: Negative for back pain, joint swelling and neck pain.   Skin: Negative for color change and dry skin.   Neurological: Negative for dizziness and confusion.   Hematological: Negative for adenopathy.   Psychiatric/Behavioral: Negative for agitation and behavioral problems.     MEDS:  Prior to Admission medications    Medication Sig Start Date End Date Taking? Authorizing Provider   levothyroxine (SYNTHROID, LEVOTHROID) 100 MCG tablet TAKE ONE TABLET BY MOUTH DAILY 7/2/21   Antoine Dodd MD        Nerve Block, 30.7 mL, Injection, Once  sodium chloride, 10 mL, Intravenous, Q12H         lactated ringers, 50 mL/hr         Allergies:  Patient has no known allergies.    Objective     Vital Signs   Temp:  [98.1 °F (36.7 °C)-98.7 °F (37.1 °C)] 98.1 °F (36.7 °C)  Heart Rate:  [74-88] 87  Resp:  [16-20] 16  BP: (102-130)/() 102/58    Physical Exam:     General Appearance:    Alert, cooperative, in no acute distress   Head:    Normocephalic, without obvious abnormality, atraumatic   Eyes:          Conjunctivae and sclerae normal, no icterus,     Ears:    Ears appear intact with no abnormalities noted   Throat:   No oral lesions, no thrush, oral mucosa moist   Neck:   No adenopathy, supple, trachea midline, no thyromegaly   Back:     No kyphosis present, no scoliosis present, no skin lesions,      erythema or scars, no tenderness to percussion or                   palpation,   range of motion normal   Lungs:     Clear to auscultation,respirations regular, even and                  unlabored    Heart:    Regular rhythm and normal rate, normal S1 and S2, no            murmur, no gallop, no rub, no click   Chest Wall:    No abnormalities observed   Abdomen:     Normal bowel sounds, no masses, no organomegaly, soft        moderate tenderness right lower quadrant, non-distended, no guarding, no rebound tenderness    Rectal:        Extremities:   Moves all extremities well, no edema, no cyanosis, no             redness   Pulses:   Pulses palpable and equal bilaterally   Skin:   No bleeding, bruising or rash   Lymph nodes:   No palpable adenopathy   Neurologic:   A/o x 4 with no deficits       Results Review: I have reviewed the patient's labs and imaging    LABS/IMAGING:    Imaging Results (Last 72 Hours)     Procedure Component Value Units Date/Time    CT Abdomen Pelvis Without Contrast [[id number]] Collected: 08/14/21 1802     Updated: 08/14/21 1806    Narrative:      PROCEDURE: CT ABDOMEN PELVIS WO CONTRAST     COMPARISON: None     INDICATIONS: Flank pain, kidney stone suspected     TECHNIQUE: CT images were created without intravenous contrast.       PROTOCOL:   Standard imaging protocol performed      RADIATION:   DLP: 398.6 mGy*cm    Automated exposure control was utilized to minimize radiation dose.      FINDINGS:   The heart size is normal.  There is no pericardial effusion.  The lung bases are clear.     The liver is diffusely hypodense compatible with underlying hepatic steatosis.  The gallbladder   surgically absent.  There is no intrahepatic or extrahepatic biliary ductal dilatation.  The   spleen, adrenal glands, and pancreas appear within normal limits.     There is a 3 mm nonobstructing stone within the left kidney.  There is no hydronephrosis or   hydroureter.  The urinary bladder is fluid filled without wall thickening.  The uterus is present   and anteverted.  There are no adnexal masses.     There is a small hiatal hernia.  The stomach and duodenum are normal in caliber and configuration.    There are no abnormally dilated loops of small bowel to suggest small bowel obstruction or small   bowel inflammation.  There is acute appendicitis within the right lower quadrant.  There is   significant inflammatory stranding and some unorganized free fluid.  There is no evidence of free   air to suggest perforation.   There is no evidence of abscess formation.  There is no acute colitis   or diverticulitis.     The aorta is normal in caliber without evidence of aneurysm formation.  There is no mesenteric,   retroperitoneal, or pelvic adenopathy by size criteria.  There are degenerative changes of the   lumbar spine.     CONCLUSION:   1. Acute appendicitis without evidence of perforation or abscess.  2. Nonobstructing 3 mm left renal stone.  3. Small hiatal hernia.    4. Diffuse hepatic steatosis.              BARRY SCHROEDER MD         Electronically Signed and Approved By: BARRY SCHROEDER MD on 8/14/2021 at 18:03                            Lab Results (last 72 hours)     Procedure Component Value Units Date/Time    [place] Draw [[id number]] Collected: 08/14/21 1334    Specimen: Blood Updated: 08/14/21 1433    Narrative:      The following orders were created for panel order [facility] Draw.  Procedure                               Abnormality         Status                     ---------                               -----------         ------                     Green Top (Gel)[[id number]]                                  Final result               Lavender Top[[id number]]                                     Final result               Gold Top - SST[[id number]]                                   Final result                 Please view results for these tests on the individual orders.    Lavender Top [[id number]] Collected: 08/14/21 1334    Specimen: Blood Updated: 08/14/21 1433     Extra Tube hold for add-on     Comment: Auto resulted       Gold Top - SST [[id number]] Collected: 08/14/21 1334    Specimen: Blood Updated: 08/14/21 1433     Extra Tube Hold for add-ons.     Comment: Auto resulted.       Green Top (Gel) [[id number]] Collected: 08/14/21 1334    Specimen: Blood Updated: 08/14/21 1432     Extra Tube Hold for add-ons.     Comment: Auto resulted.       Comprehensive Metabolic Panel [[id number]]  (Abnormal) Collected: 08/14/21 1334     Specimen: Blood Updated: 08/14/21 1414     Glucose 137 mg/dL      BUN 10 mg/dL      Creatinine 0.84 mg/dL      Sodium 137 mmol/L      Potassium 4.5 mmol/L      Chloride 100 mmol/L      CO2 24.7 mmol/L      Calcium 9.7 mg/dL      Total Protein 7.9 g/dL      Albumin 4.80 g/dL      ALT (SGPT) 30 U/L      AST (SGOT) 16 U/L      Alkaline Phosphatase 66 U/L      Total Bilirubin 0.4 mg/dL      eGFR Non African Amer 70 mL/min/1.73      Globulin 3.1 gm/dL      A/G Ratio 1.5 g/dL      BUN/Creatinine Ratio 11.9     Anion Gap 12.3 mmol/L     Narrative:      GFR Normal >60  Chronic Kidney Disease <60  Kidney Failure <15      Lipase [[id number]]  (Normal) Collected: 08/14/21 1334    Specimen: Blood Updated: 08/14/21 1414     Lipase 20 U/L     Urinalysis With Microscopic If Indicated (No Culture) - [[id number]]  (Abnormal) Collected: 08/14/21 1334    Specimen: Urine Updated: 08/14/21 1413     Color, UA Yellow     Appearance, UA Clear     pH, UA 5.5     Specific Gravity, UA 1.022     Glucose, UA Negative     Ketones, UA Negative     Bilirubin, UA Negative     Blood, UA Moderate (2+)     Protein, UA Trace     Leuk Esterase, UA Negative     Nitrite, UA Negative     Urobilinogen, UA 0.2 E.U./dL    Urinalysis, Microscopic Only - Urine, Clean Catch [[id number]]  (Abnormal) Collected: 08/14/21 1334    Specimen: Urine Updated: 08/14/21 1413     RBC, UA 13-20 /HPF      WBC, UA None Seen /HPF      Bacteria, UA 2+ /HPF      Squamous Epithelial Cells, UA 0-2 /HPF      Mucus, UA Moderate/2+ /HPF      Methodology Manual Light Microscopy    CBC & Differential [[id number]]  (Abnormal) Collected: 08/14/21 1334    Specimen: Blood Updated: 08/14/21 1344    Narrative:      The following orders were created for panel order CBC & Differential.  Procedure                               Abnormality         Status                     ---------                               -----------         ------                     CBC Auto Differential[[id number]]         Abnormal            Final result                 Please view results for these tests on the individual orders.    CBC Auto Differential [[id number]]  (Abnormal) Collected: 08/14/21 1334    Specimen: Blood Updated: 08/14/21 1344     WBC 24.22 10*3/mm3      RBC 5.09 10*6/mm3      Hemoglobin 15.2 g/dL      Hematocrit 46.1 %      MCV 90.6 fL      MCH 29.9 pg      MCHC 33.0 g/dL      RDW 13.2 %      RDW-SD 43.8 fl      MPV 9.9 fL      Platelets 301 10*3/mm3      Neutrophil % 82.2 %      Lymphocyte % 8.9 %      Monocyte % 7.2 %      Eosinophil % 0.5 %      Basophil % 0.5 %      Immature Grans % 0.7 %      Neutrophils, Absolute 19.94 10*3/mm3      Lymphocytes, Absolute 2.16 10*3/mm3      Monocytes, Absolute 1.74 10*3/mm3      Eosinophils, Absolute 0.11 10*3/mm3      Basophils, Absolute 0.11 10*3/mm3      Immature Grans, Absolute 0.16 10*3/mm3      nRBC 0.0 /100 WBC              Result Review :     Assessment/Plan     Acute appendicitis with localized peritonitis, without perforation, abscess, or gangrene  Leukocytosis  Tobacco abuse    I have reviewed the patient's work-up including labs and imaging with the results mentioned above.  CT abdomen and pelvis with images personally reviewed with the patient.  I recommended laparoscopic possible open appendectomy.  I explained the procedure and recovery.  Benefits and alternatives discussed.  Risk procedure including risk of anesthesia, bleeding, infection, conversion open, possible bowel resection, hernia, heart attack, stroke, blood clot, pneumonia, damage to surrounding structures were discussed.  All questions answered.  Consent obtained.  Continue NPO.    Discharge instructions given.  Follow-up with me in 2 weeks.  Call for concern for infection.  No heavy lifting for 2 weeks.  No working or driving on pain medication.  All questions answered.    Smoking cessation counseling performed.    Aida Puga  reports that she has been smoking cigarettes. She has been  smoking about 1.00 pack per day. She has never used smokeless tobacco.. I have educated her on the risk of diseases from using tobacco products such as negative impact smoking has on her health.    I advised her to quit and she is not willing to quit.    I spent 3  minutes counseling the patient.            Ilan Fajardo MD  08/14/21 21:10 EDT

## 2021-08-16 ENCOUNTER — NURSE TRIAGE (OUTPATIENT)
Dept: CALL CENTER | Facility: HOSPITAL | Age: 56
End: 2021-08-16

## 2021-08-16 NOTE — TELEPHONE ENCOUNTER
She can shower today    Reason for Disposition  • Getting the incision wet, questions about    Additional Information  • Negative: Sounds like a life-threatening emergency to the triager  • Negative: Chest pain  • Negative: Difficulty breathing  • Negative: Acting confused (e.g., disoriented, slurred speech) or excessively sleepy  • Negative: Surgical incision symptoms and questions  • Negative: [1] Discomfort (pain, burning or stinging) when passing urine AND [2] male  • Negative: [1] Discomfort (pain, burning or stinging) when passing urine AND [2] female  • Negative: Constipation  • Negative: New or worsening leg (calf, thigh) pain  • Negative: New or worsening leg swelling  • Negative: Dizziness is severe, or persists > 24 hours after surgery  • Negative: Pain, redness, swelling, or pus at IV Site  • Negative: Symptoms arising from use of a urinary catheter (Nath or Coude)  • Negative: Cast problems or questions  • Negative: Medication question  • Negative: [1] Widespread rash AND [2] bright red, sunburn-like  • Negative: [1] SEVERE headache AND [2] after spinal (epidural) anesthesia  • Negative: [1] Vomiting AND [2] persists > 4 hours  • Negative: [1] Vomiting AND [2] abdomen looks much more swollen than usual  • Negative: [1] Drinking very little AND [2] dehydration suspected (e.g., no urine > 12 hours, very dry mouth, very lightheaded)  • Negative: Patient sounds very sick or weak to the triager  • Negative: Sounds like a serious complication to the triager  • Negative: Fever > 100.4 F (38.0 C)  • Negative: [1] SEVERE post-op pain (e.g., excruciating, pain scale 8-10) AND [2] not controlled with pain medications  • Negative: [1] Caller has URGENT question AND [2] triager unable to answer question  • Negative: [1] Headache AND [2] after spinal (epidural) anesthesia AND [3] not severe  • Negative: Fever present > 3 days (72 hours)  • Negative: [1] MILD-MODERATE post-op pain (e.g., pain scale 1-7) AND [2] not  "controlled with pain medications  • Negative: [1] Caller has NON-URGENT question AND [2] triager unable to answer question  • Negative: General activity, questions about  • Negative: Resuming driving, questions about  • Negative: Resuming sexual relations, questions about    Answer Assessment - Initial Assessment Questions  1. SYMPTOM: \"What's the main symptom you're concerned about?\" (e.g., pain, fever, vomiting)      Can she shower  2. ONSET: \"When did   start?\"      8/14  3. SURGERY: \"What surgery was performed?\"      Appendectomy  4. DATE of SURGERY: \"When was surgery performed?\"       8/14  5. ANESTHESIA: \" What type of anesthesia did you have?\" (e.g., general, spinal, epidural, local)      general  6. PAIN: \"Is there any pain?\" If Yes, ask: \"How bad is it?\"  (Scale 1-10; or mild, moderate, severe)      soreness  7. FEVER: \"Do you have a fever?\" If Yes, ask: \"What is your temperature, how was it measured, and when did it start?\"      afebrile  8. VOMITING: \"Is there any vomiting?\" If yes, ask: \"How many times?\"      no  9. BLEEDING: \"Is there any bleeding?\" If Yes, ask: \"How much?\" and \"Where?\"      no  10. OTHER SYMPTOMS: \"Do you have any other symptoms?\" (e.g., drainage from wound, painful urination, constipation)        none    Protocols used: POST-OP SYMPTOMS AND QUESTIONS-ADULT-      "

## 2021-08-17 LAB
CYTO UR: NORMAL
LAB AP CASE REPORT: NORMAL
LAB AP CLINICAL INFORMATION: NORMAL
PATH REPORT.FINAL DX SPEC: NORMAL
PATH REPORT.GROSS SPEC: NORMAL

## 2021-08-26 ENCOUNTER — OFFICE VISIT (OUTPATIENT)
Dept: SURGERY | Facility: CLINIC | Age: 56
End: 2021-08-26

## 2021-08-26 VITALS — BODY MASS INDEX: 26.56 KG/M2 | WEIGHT: 155.6 LBS | HEIGHT: 64 IN

## 2021-08-26 DIAGNOSIS — Z12.12 SCREENING FOR COLORECTAL CANCER: Primary | ICD-10-CM

## 2021-08-26 DIAGNOSIS — Z12.11 SCREENING FOR COLORECTAL CANCER: Primary | ICD-10-CM

## 2021-08-26 PROCEDURE — S0285 CNSLT BEFORE SCREEN COLONOSC: HCPCS | Performed by: SURGERY

## 2021-08-27 NOTE — PROGRESS NOTES
General Surgery/Colorectal Surgery Note    Patient Name:  Aida Puga  YOB: 1965  [id number]    Referring Provider: No ref. provider found      Patient Care Team:  Antoine Dodd MD as PCP - General (Family Medicine)    Chief complaint follow-up after surgery    Subjective .     History of present illness: History of acute appendicitis status post laparoscopic appendectomy 8/14/2021.  Pathology with appendicitis.  She comes in for follow-up.  No fever, erythema, drainage.  She is pleased with her surgery.  No previous colonoscopy.  No family history of colorectal cancer.      History:  Past Medical History:   Diagnosis Date   • Disease of thyroid gland        Past Surgical History:   Procedure Laterality Date   • APPENDECTOMY N/A 8/14/2021    Procedure: APPENDECTOMY LAPAROSCOPIC POSSIBLE OPEN;  Surgeon: Ilan Fajardo MD;  Location: [facility] OR;  Service: General;  Laterality: N/A;   • CHOLECYSTECTOMY     • TUBAL ABDOMINAL LIGATION         Family History   Problem Relation Age of Onset   • Lung cancer Mother    • Cancer Father        Social History     Tobacco Use   • Smoking status: Current Every Day Smoker     Packs/day: 1.00     Types: Cigarettes   • Smokeless tobacco: Never Used   Vaping Use   • Vaping Use: Never used   Substance Use Topics   • Alcohol use: Never   • Drug use: Never       Review of Systems  All systems were reviewed and negative except for:   Review of Systems   Constitutional: Negative for chills, fever and unexpected weight loss.   HENT: Negative for congestion, nosebleeds and voice change.    Eyes: Negative for blurred vision, double vision and discharge.   Respiratory: Negative for apnea, chest tightness and shortness of breath.    Cardiovascular: Negative for chest pain and leg swelling.   Gastrointestinal:        See HPI   Endocrine: Negative for cold intolerance and heat intolerance.   Genitourinary: Negative for dysuria, hematuria and urgency.    Musculoskeletal: Negative for back pain, joint swelling and neck pain.   Skin: Negative for color change and dry skin.   Neurological: Negative for dizziness and confusion.   Hematological: Negative for adenopathy.   Psychiatric/Behavioral: Negative for agitation and behavioral problems.     MEDS:  Prior to Admission medications    Medication Sig Start Date End Date Taking? Authorizing Provider   levothyroxine (SYNTHROID, LEVOTHROID) 100 MCG tablet TAKE ONE TABLET BY MOUTH DAILY 7/2/21  Yes Antoine Dodd MD   oxyCODONE-acetaminophen (Percocet) 5-325 MG per tablet Take 1 tablet by mouth Every 6 (Six) Hours As Needed for Moderate Pain . 8/14/21   Ilan Fajardo MD   polyethylene glycol (MIRALAX) 17 g packet Take 17 g by mouth Daily. 8/14/21   Ilan Fajardo MD        Allergies:  Patient has no known allergies.    Objective     Vital Signs        Physical Exam:     General Appearance:    Alert, cooperative, in no acute distress   Head:    Normocephalic, without obvious abnormality, atraumatic   Eyes:          Conjunctivae and sclerae normal, no icterus,     Ears:    Ears appear intact with no abnormalities noted   Throat:   No oral lesions, no thrush, oral mucosa moist   Neck:   No adenopathy, supple, trachea midline, no thyromegaly   Back:     No kyphosis present, no scoliosis present, no skin lesions,      erythema or scars, no tenderness to percussion or                   palpation,   range of motion normal   Lungs:     Clear to auscultation,respirations regular, even and                  unlabored    Heart:    Regular rhythm and normal rate, normal S1 and S2, no            murmur, no gallop, no rub, no click   Chest Wall:    No abnormalities observed   Abdomen:     Normal bowel sounds, no masses, no organomegaly, soft        non-tender, non-distended, no guarding, no rebound                tenderness, incisions are clean dry intact with evidence of infection, soft, nontender, no hernia   Rectal:  "       Extremities:   Moves all extremities well, no edema, no cyanosis, no             redness   Pulses:   Pulses palpable and equal bilaterally   Skin:   No bleeding, bruising or rash   Lymph nodes:   No palpable adenopathy   Neurologic:   A/o x 4 with no deficits       Results Review:   {Results Review:24791::\"I reviewed the patient's new clinical results.\"    LABS/IMAGING:  Results for orders placed or performed during the hospital encounter of 08/14/21   Comprehensive Metabolic Panel    Specimen: Blood   Result Value Ref Range    Glucose 137 (H) 65 - 99 mg/dL    BUN 10 6 - 20 mg/dL    Creatinine 0.84 0.57 - 1.00 mg/dL    Sodium 137 136 - 145 mmol/L    Potassium 4.5 3.5 - 5.2 mmol/L    Chloride 100 98 - 107 mmol/L    CO2 24.7 22.0 - 29.0 mmol/L    Calcium 9.7 8.6 - 10.5 mg/dL    Total Protein 7.9 6.0 - 8.5 g/dL    Albumin 4.80 3.50 - 5.20 g/dL    ALT (SGPT) 30 1 - 33 U/L    AST (SGOT) 16 1 - 32 U/L    Alkaline Phosphatase 66 39 - 117 U/L    Total Bilirubin 0.4 0.0 - 1.2 mg/dL    eGFR Non African Amer 70 >60 mL/min/1.73    Globulin 3.1 gm/dL    A/G Ratio 1.5 g/dL    BUN/Creatinine Ratio 11.9 7.0 - 25.0    Anion Gap 12.3 5.0 - 15.0 mmol/L   Lipase    Specimen: Blood   Result Value Ref Range    Lipase 20 13 - 60 U/L   Urinalysis With Microscopic If Indicated (No Culture) -    Specimen: Urine   Result Value Ref Range    Color, UA Yellow Yellow, Straw    Appearance, UA Clear Clear    pH, UA 5.5 5.0 - 8.0    Specific Gravity, UA 1.022 1.005 - 1.030    Glucose, UA Negative Negative    Ketones, UA Negative Negative    Bilirubin, UA Negative Negative    Blood, UA Moderate (2+) (A) Negative    Protein, UA Trace (A) Negative    Leuk Esterase, UA Negative Negative    Nitrite, UA Negative Negative    Urobilinogen, UA 0.2 E.U./dL 0.2 - 1.0 E.U./dL   CBC Auto Differential    Specimen: Blood   Result Value Ref Range    WBC 24.22 (H) 3.40 - 10.80 10*3/mm3    RBC 5.09 3.77 - 5.28 10*6/mm3    Hemoglobin 15.2 12.0 - 15.9 g/dL    " Hematocrit 46.1 34.0 - 46.6 %    MCV 90.6 79.0 - 97.0 fL    MCH 29.9 26.6 - 33.0 pg    MCHC 33.0 31.5 - 35.7 g/dL    RDW 13.2 12.3 - 15.4 %    RDW-SD 43.8 37.0 - 54.0 fl    MPV 9.9 6.0 - 12.0 fL    Platelets 301 140 - 450 10*3/mm3    Neutrophil % 82.2 (H) 42.7 - 76.0 %    Lymphocyte % 8.9 (L) 19.6 - 45.3 %    Monocyte % 7.2 5.0 - 12.0 %    Eosinophil % 0.5 0.3 - 6.2 %    Basophil % 0.5 0.0 - 1.5 %    Immature Grans % 0.7 (H) 0.0 - 0.5 %    Neutrophils, Absolute 19.94 (H) 1.70 - 7.00 10*3/mm3    Lymphocytes, Absolute 2.16 0.70 - 3.10 10*3/mm3    Monocytes, Absolute 1.74 (H) 0.10 - 0.90 10*3/mm3    Eosinophils, Absolute 0.11 0.00 - 0.40 10*3/mm3    Basophils, Absolute 0.11 0.00 - 0.20 10*3/mm3    Immature Grans, Absolute 0.16 (H) 0.00 - 0.05 10*3/mm3    nRBC 0.0 0.0 - 0.2 /100 WBC   Urinalysis, Microscopic Only - Urine, Clean Catch    Specimen: Urine   Result Value Ref Range    RBC, UA 13-20 (A) None Seen /HPF    WBC, UA None Seen None Seen /HPF    Bacteria, UA 2+ (A) None Seen /HPF    Squamous Epithelial Cells, UA 0-2 None Seen, 0-2 /HPF    Mucus, UA Moderate/2+ (A) None Seen, Trace /HPF    Methodology Manual Light Microscopy    Tissue Pathology Exam    Specimen: Large Intestine, Appendix; Tissue   Result Value Ref Range    Case Report       Surgical Pathology Report                         Case: [id number]                                  Authorizing Provider:  Ilan Fajardo MD  Collected:           08/14/2021 10:24 PM          Ordering Location:     [facility] MAIN Received:            08/16/2021 02:19 AM                                 OR                                                                           Pathologist:           Franchesca Chandra MD                                                     Specimen:    Large Intestine, Appendix, appendix                                                        Clinical Information      Final Diagnosis       Appendix, appendectomy:    - Acute  appendicitis and periappendicitis        Gross Description       Appendix: Received in formalin is a light tan dusky vermiform appendix measuring 9.5 cm in length and 1.4 cm in diameter.  The appendix is dilated however no evidence of perforation is noted on palpation.  The serosal surface is partially covered with white exudate.  Sectioning reveals the dilated lumen to contain soft and watery brown stool.  No fecalith is identified grossly.  Rep 1A and 1B.  1A-distal and proximal ends of appendix, 1B-representative midportion.  CRE      Microscopic Description     Green Top (Gel)   Result Value Ref Range    Extra Tube Hold for add-ons.    Lavender Top   Result Value Ref Range    Extra Tube hold for add-on    Gold Top - SST   Result Value Ref Range    Extra Tube Hold for add-ons.         Result Review :     Assessment/Plan     History of acute appendicitis status post laparoscopic appendectomy 8/14/2021.  Pathology with appendicitis.   Asymptomatic screening for colorectal cancer.    I reviewed the pathology with the patient.  She may slowly increase her activity as tolerated.  I recommended screening colonoscopy.  She declines.  Cologuard testing ordered to be done in the near future.  Follow-up with me as needed.  All questions answered.  She agrees with the plan.  Thank you for this consult.              This document has been electronically signed by Ilan Fajardo MD  August 27, 2021 09:01 EDT

## 2021-10-05 RX ORDER — LEVOTHYROXINE SODIUM 0.1 MG/1
TABLET ORAL
Qty: 90 TABLET | Refills: 1 | Status: SHIPPED | OUTPATIENT
Start: 2021-10-05 | End: 2022-05-10 | Stop reason: SDUPTHER

## 2022-04-05 RX ORDER — LEVOTHYROXINE SODIUM 0.1 MG/1
TABLET ORAL
Qty: 90 TABLET | Refills: 1 | OUTPATIENT
Start: 2022-04-05

## 2022-05-10 ENCOUNTER — LAB (OUTPATIENT)
Dept: LAB | Facility: HOSPITAL | Age: 57
End: 2022-05-10

## 2022-05-10 ENCOUNTER — PATIENT ROUNDING (BHMG ONLY) (OUTPATIENT)
Dept: FAMILY MEDICINE CLINIC | Facility: CLINIC | Age: 57
End: 2022-05-10

## 2022-05-10 ENCOUNTER — OFFICE VISIT (OUTPATIENT)
Dept: FAMILY MEDICINE CLINIC | Facility: CLINIC | Age: 57
End: 2022-05-10

## 2022-05-10 VITALS
DIASTOLIC BLOOD PRESSURE: 61 MMHG | WEIGHT: 162 LBS | SYSTOLIC BLOOD PRESSURE: 122 MMHG | HEIGHT: 64 IN | HEART RATE: 56 BPM | BODY MASS INDEX: 27.66 KG/M2 | OXYGEN SATURATION: 99 %

## 2022-05-10 DIAGNOSIS — Z01.89 ROUTINE LAB DRAW: ICD-10-CM

## 2022-05-10 DIAGNOSIS — E03.9 HYPOTHYROIDISM, UNSPECIFIED TYPE: Primary | ICD-10-CM

## 2022-05-10 DIAGNOSIS — E03.9 HYPOTHYROIDISM, UNSPECIFIED TYPE: ICD-10-CM

## 2022-05-10 LAB
T4 FREE SERPL-MCNC: 0.55 NG/DL (ref 0.93–1.7)
TSH SERPL DL<=0.05 MIU/L-ACNC: 24.9 UIU/ML (ref 0.27–4.2)

## 2022-05-10 PROCEDURE — 36415 COLL VENOUS BLD VENIPUNCTURE: CPT

## 2022-05-10 PROCEDURE — 99203 OFFICE O/P NEW LOW 30 MIN: CPT | Performed by: NURSE PRACTITIONER

## 2022-05-10 PROCEDURE — 84439 ASSAY OF FREE THYROXINE: CPT

## 2022-05-10 PROCEDURE — 84443 ASSAY THYROID STIM HORMONE: CPT

## 2022-05-10 RX ORDER — LEVOTHYROXINE SODIUM 0.1 MG/1
100 TABLET ORAL DAILY
Qty: 90 TABLET | Refills: 1 | Status: SHIPPED | OUTPATIENT
Start: 2022-05-10 | End: 2022-05-11

## 2022-05-10 NOTE — PROGRESS NOTES
A [employer] message has been sent to the patient for PATIENT ROUNDING with [facility].

## 2022-05-10 NOTE — PROGRESS NOTES
"Chief Complaint  Hyperthyroidism, establish care  Subjective          Aida Puga presents to [facility] for   History of Present Illness  Patient is here today to establish Care (Patient previously saw Antoine Dodd 5 years ago. ) and Hypothyroidism (Levothyroxine, patient has been out for about a week.)  Pt states has no insurance.  He declines any and all screenings including lung cancer screening, mammogram, colonoscopy, or any labs outside of thyroid.  Medical History  Past Medical History:   Diagnosis Date   • Disease of thyroid gland      Surgical History  Past Surgical History:   Procedure Laterality Date   • APPENDECTOMY N/A 8/14/2021    Procedure: APPENDECTOMY LAPAROSCOPIC POSSIBLE OPEN;  Surgeon: Ilan Fajardo MD;  Location: [facility] MAIN OR;  Service: General;  Laterality: N/A;   • CHOLECYSTECTOMY     • TUBAL ABDOMINAL LIGATION       Social History  Social History     Socioeconomic History   • Marital status:    Tobacco Use   • Smoking status: Current Every Day Smoker     Packs/day: 1.00     Types: Cigarettes   • Smokeless tobacco: Never Used   Vaping Use   • Vaping Use: Never used   Substance and Sexual Activity   • Alcohol use: Never   • Drug use: Never   • Sexual activity: Defer       Current Outpatient Medications:   •  levothyroxine (SYNTHROID, LEVOTHROID) 100 MCG tablet, Take 1 tablet by mouth Daily., Disp: 90 tablet, Rfl: 1    Review of Systems     Objective     /61   Pulse 56   Ht 162.6 cm (64\")   Wt 73.5 kg (162 lb)   SpO2 99%   BMI 27.81 kg/m²     Body mass index is 27.81 kg/m².    Physical Exam  Vitals reviewed.   Constitutional:       Appearance: Normal appearance. She is well-developed.   HENT:      Head: Normocephalic and atraumatic.      Right Ear: External ear normal.      Left Ear: External ear normal.   Eyes:      Conjunctiva/sclera: Conjunctivae normal.      Pupils: Pupils are equal, round, and reactive to light. "   Cardiovascular:      Rate and Rhythm: Normal rate and regular rhythm.      Heart sounds: No murmur heard.    No friction rub. No gallop.   Pulmonary:      Effort: Pulmonary effort is normal.      Breath sounds: Normal breath sounds. No wheezing or rhonchi.   Skin:     General: Skin is warm and dry.   Neurological:      Mental Status: She is alert and oriented to person, place, and time.      Cranial Nerves: No cranial nerve deficit.   Psychiatric:         Mood and Affect: Mood and affect normal.         Behavior: Behavior normal.         Thought Content: Thought content normal.         Judgment: Judgment normal.         Result Review :     The following data was reviewed by: JOHN Alvarado on 05/10/2022:                         Assessment:  Diagnoses and all orders for this visit:    1. Hypothyroidism, unspecified type (Primary)  -     levothyroxine (SYNTHROID, LEVOTHROID) 100 MCG tablet; Take 1 tablet by mouth Daily.  Dispense: 90 tablet; Refill: 1  -     TSH+Free T4; Future    2. Routine lab draw  -     TSH+Free T4; Future                Follow Up     Return in about 6 months (around 11/10/2022).    Patient was given instructions and counseling regarding her condition or for health maintenance advice. Please see specific information pulled into the AVS if appropriate.     JOHN Alvarado  05/10/2022

## 2022-05-11 ENCOUNTER — TELEPHONE (OUTPATIENT)
Dept: FAMILY MEDICINE CLINIC | Facility: CLINIC | Age: 57
End: 2022-05-11

## 2022-05-11 DIAGNOSIS — E03.9 HYPOTHYROIDISM, UNSPECIFIED TYPE: Primary | ICD-10-CM

## 2022-05-11 RX ORDER — LEVOTHYROXINE SODIUM 0.12 MG/1
125 TABLET ORAL DAILY
Qty: 90 TABLET | Refills: 1 | Status: SHIPPED | OUTPATIENT
Start: 2022-05-11 | End: 2022-11-08

## 2022-05-11 NOTE — TELEPHONE ENCOUNTER
----- Message from JOHN Ramos sent at 5/11/2022  7:11 AM EDT -----  Significantly under replaced, we need to increase your dose of levothyroxine to 125 mcg 1 tab p.o. daily #90 with 1 refill and we will recheck a TSH and free T4 in 8 weeks

## 2022-05-27 ENCOUNTER — PATIENT ROUNDING (BHMG ONLY) (OUTPATIENT)
Dept: FAMILY MEDICINE CLINIC | Facility: CLINIC | Age: 57
End: 2022-05-27

## 2022-05-27 NOTE — PROGRESS NOTES
A [employer] message has been sent to the patient for PATIENT ROUNDING with [facility] [place] [facility].

## 2022-07-19 ENCOUNTER — TELEPHONE (OUTPATIENT)
Dept: FAMILY MEDICINE CLINIC | Facility: CLINIC | Age: 57
End: 2022-07-19

## 2022-11-05 DIAGNOSIS — E03.9 HYPOTHYROIDISM, UNSPECIFIED TYPE: ICD-10-CM

## 2022-11-08 RX ORDER — LEVOTHYROXINE SODIUM 0.12 MG/1
TABLET ORAL
Qty: 90 TABLET | Refills: 0 | Status: SHIPPED | OUTPATIENT
Start: 2022-11-08 | End: 2023-02-06

## 2023-02-05 DIAGNOSIS — E03.9 HYPOTHYROIDISM, UNSPECIFIED TYPE: ICD-10-CM

## 2023-02-06 RX ORDER — LEVOTHYROXINE SODIUM 0.12 MG/1
125 TABLET ORAL DAILY
Qty: 30 TABLET | Refills: 2 | Status: SHIPPED | OUTPATIENT
Start: 2023-02-06 | End: 2023-03-15 | Stop reason: SDUPTHER

## 2023-03-15 ENCOUNTER — LAB (OUTPATIENT)
Dept: LAB | Facility: HOSPITAL | Age: 58
End: 2023-03-15
Payer: MEDICARE

## 2023-03-15 ENCOUNTER — OFFICE VISIT (OUTPATIENT)
Dept: FAMILY MEDICINE CLINIC | Facility: CLINIC | Age: 58
End: 2023-03-15
Payer: MEDICARE

## 2023-03-15 VITALS
DIASTOLIC BLOOD PRESSURE: 63 MMHG | HEIGHT: 64 IN | OXYGEN SATURATION: 98 % | WEIGHT: 158 LBS | SYSTOLIC BLOOD PRESSURE: 111 MMHG | HEART RATE: 76 BPM | BODY MASS INDEX: 26.98 KG/M2

## 2023-03-15 DIAGNOSIS — R14.0 ABDOMINAL BLOATING: Primary | ICD-10-CM

## 2023-03-15 DIAGNOSIS — H69.82 DYSFUNCTION OF LEFT EUSTACHIAN TUBE: ICD-10-CM

## 2023-03-15 DIAGNOSIS — R19.05 PERIUMBILIC SWELLING, MASS OR LUMP: ICD-10-CM

## 2023-03-15 DIAGNOSIS — R10.816 EPIGASTRIC ABDOMINAL TENDERNESS WITHOUT REBOUND TENDERNESS: ICD-10-CM

## 2023-03-15 DIAGNOSIS — E03.9 HYPOTHYROIDISM, UNSPECIFIED TYPE: ICD-10-CM

## 2023-03-15 DIAGNOSIS — R73.9 HYPERGLYCEMIA: ICD-10-CM

## 2023-03-15 DIAGNOSIS — R14.0 ABDOMINAL BLOATING: ICD-10-CM

## 2023-03-15 LAB
ALBUMIN SERPL-MCNC: 4.5 G/DL (ref 3.5–5.2)
ALBUMIN/GLOB SERPL: 1.6 G/DL
ALP SERPL-CCNC: 68 U/L (ref 39–117)
ALT SERPL W P-5'-P-CCNC: 18 U/L (ref 1–33)
ANION GAP SERPL CALCULATED.3IONS-SCNC: 11 MMOL/L (ref 5–15)
AST SERPL-CCNC: 16 U/L (ref 1–32)
BASOPHILS # BLD AUTO: 0.12 10*3/MM3 (ref 0–0.2)
BASOPHILS NFR BLD AUTO: 1.2 % (ref 0–1.5)
BILIRUB SERPL-MCNC: <0.2 MG/DL (ref 0–1.2)
BUN SERPL-MCNC: 11 MG/DL (ref 6–20)
BUN/CREAT SERPL: 13.1 (ref 7–25)
CALCIUM SPEC-SCNC: 9.3 MG/DL (ref 8.6–10.5)
CHLORIDE SERPL-SCNC: 104 MMOL/L (ref 98–107)
CO2 SERPL-SCNC: 26 MMOL/L (ref 22–29)
CREAT SERPL-MCNC: 0.84 MG/DL (ref 0.57–1)
DEPRECATED RDW RBC AUTO: 41.6 FL (ref 37–54)
EGFRCR SERPLBLD CKD-EPI 2021: 80.2 ML/MIN/1.73
EOSINOPHIL # BLD AUTO: 0.5 10*3/MM3 (ref 0–0.4)
EOSINOPHIL NFR BLD AUTO: 4.9 % (ref 0.3–6.2)
ERYTHROCYTE [DISTWIDTH] IN BLOOD BY AUTOMATED COUNT: 12.8 % (ref 12.3–15.4)
GLOBULIN UR ELPH-MCNC: 2.8 GM/DL
GLUCOSE SERPL-MCNC: 106 MG/DL (ref 65–99)
HCT VFR BLD AUTO: 41.2 % (ref 34–46.6)
HGB BLD-MCNC: 14.1 G/DL (ref 12–15.9)
IMM GRANULOCYTES # BLD AUTO: 0.04 10*3/MM3 (ref 0–0.05)
IMM GRANULOCYTES NFR BLD AUTO: 0.4 % (ref 0–0.5)
LYMPHOCYTES # BLD AUTO: 3.65 10*3/MM3 (ref 0.7–3.1)
LYMPHOCYTES NFR BLD AUTO: 35.8 % (ref 19.6–45.3)
MCH RBC QN AUTO: 30.7 PG (ref 26.6–33)
MCHC RBC AUTO-ENTMCNC: 34.2 G/DL (ref 31.5–35.7)
MCV RBC AUTO: 89.8 FL (ref 79–97)
MONOCYTES # BLD AUTO: 0.8 10*3/MM3 (ref 0.1–0.9)
MONOCYTES NFR BLD AUTO: 7.8 % (ref 5–12)
NEUTROPHILS NFR BLD AUTO: 49.9 % (ref 42.7–76)
NEUTROPHILS NFR BLD AUTO: 5.09 10*3/MM3 (ref 1.7–7)
NRBC BLD AUTO-RTO: 0 /100 WBC (ref 0–0.2)
PLATELET # BLD AUTO: 285 10*3/MM3 (ref 140–450)
PMV BLD AUTO: 10.5 FL (ref 6–12)
POTASSIUM SERPL-SCNC: 4.5 MMOL/L (ref 3.5–5.2)
PROT SERPL-MCNC: 7.3 G/DL (ref 6–8.5)
RBC # BLD AUTO: 4.59 10*6/MM3 (ref 3.77–5.28)
SODIUM SERPL-SCNC: 141 MMOL/L (ref 136–145)
T4 FREE SERPL-MCNC: 1.64 NG/DL (ref 0.93–1.7)
TSH SERPL DL<=0.05 MIU/L-ACNC: 0.06 UIU/ML (ref 0.27–4.2)
WBC NRBC COR # BLD: 10.2 10*3/MM3 (ref 3.4–10.8)

## 2023-03-15 PROCEDURE — 83036 HEMOGLOBIN GLYCOSYLATED A1C: CPT

## 2023-03-15 PROCEDURE — 80053 COMPREHEN METABOLIC PANEL: CPT

## 2023-03-15 PROCEDURE — 84439 ASSAY OF FREE THYROXINE: CPT

## 2023-03-15 PROCEDURE — 1160F RVW MEDS BY RX/DR IN RCRD: CPT | Performed by: NURSE PRACTITIONER

## 2023-03-15 PROCEDURE — 84480 ASSAY TRIIODOTHYRONINE (T3): CPT

## 2023-03-15 PROCEDURE — 36415 COLL VENOUS BLD VENIPUNCTURE: CPT

## 2023-03-15 PROCEDURE — 1159F MED LIST DOCD IN RCRD: CPT | Performed by: NURSE PRACTITIONER

## 2023-03-15 PROCEDURE — 84479 ASSAY OF THYROID (T3 OR T4): CPT

## 2023-03-15 PROCEDURE — 84443 ASSAY THYROID STIM HORMONE: CPT

## 2023-03-15 PROCEDURE — 85025 COMPLETE CBC W/AUTO DIFF WBC: CPT

## 2023-03-15 PROCEDURE — 99214 OFFICE O/P EST MOD 30 MIN: CPT | Performed by: NURSE PRACTITIONER

## 2023-03-15 RX ORDER — METHYLPREDNISOLONE 4 MG/1
TABLET ORAL
Qty: 1 EACH | Refills: 0 | Status: SHIPPED | OUTPATIENT
Start: 2023-03-15

## 2023-03-15 RX ORDER — LEVOTHYROXINE SODIUM 0.12 MG/1
125 TABLET ORAL DAILY
Qty: 30 TABLET | Refills: 2 | Status: SHIPPED | OUTPATIENT
Start: 2023-03-15

## 2023-03-15 NOTE — PROGRESS NOTES
"Chief Complaint  Hypothyroidism and Ear Fullness    SUBJECTIVE  Aida Puga presents to [facility] for 6 month follow up.     Pt states she hears an echo in her right ear, occasionally feels like there is fluid in it, and it feels full.     Pt is requesting to have her appy incision site looked at, states she can feel a knot. Pt had surgery in 2021 by Dr. Fajardo.     Pt is declining mammogram and colon screenings.     Patient is also complaining of abdominal swelling, states her stomach states swollen at the top where it should not, patient reports that it is also tender to touch       History of Present Illness  Past Medical History:   Diagnosis Date   • Disease of thyroid gland       Family History   Problem Relation Age of Onset   • Lung cancer Mother    • Cancer Father       Past Surgical History:   Procedure Laterality Date   • APPENDECTOMY N/A 8/14/2021    Procedure: APPENDECTOMY LAPAROSCOPIC POSSIBLE OPEN;  Surgeon: Ilan Fajardo MD;  Location: [facility];  Service: General;  Laterality: N/A;   • CHOLECYSTECTOMY     • TUBAL ABDOMINAL LIGATION          Current Outpatient Medications:   •  levothyroxine (SYNTHROID, LEVOTHROID) 125 MCG tablet, Take 1 tablet by mouth Daily. APPOINTMENT NEEDED FOR ADDITIONAL REFILLS, Disp: 30 tablet, Rfl: 2  •  methylPREDNISolone (MEDROL) 4 MG dose pack, Take as directed on package instructions., Disp: 1 each, Rfl: 0    OBJECTIVE  Vital Signs:   /63   Pulse 76   Ht 162.6 cm (64\")   Wt 71.7 kg (158 lb)   SpO2 98%   BMI 27.12 kg/m²    Estimated body mass index is 27.12 kg/m² as calculated from the following:    Height as of this encounter: 162.6 cm (64\").    Weight as of this encounter: 71.7 kg (158 lb).     Wt Readings from Last 3 Encounters:   03/15/23 71.7 kg (158 lb)   05/10/22 73.5 kg (162 lb)   08/26/21 70.6 kg (155 lb 9.6 oz)     BP Readings from Last 3 Encounters:   03/15/23 111/63   05/10/22 122/61   08/14/21 " 107/61       Physical Exam  Vitals reviewed.   Constitutional:       Appearance: Normal appearance. She is well-developed.   HENT:      Head: Normocephalic and atraumatic.      Right Ear: Ear canal and external ear normal.      Left Ear: Tympanic membrane, ear canal and external ear normal.      Ears:      Comments: Fluid noted right TM  Eyes:      Conjunctiva/sclera: Conjunctivae normal.      Pupils: Pupils are equal, round, and reactive to light.   Cardiovascular:      Rate and Rhythm: Normal rate and regular rhythm.      Heart sounds: No murmur heard.    No friction rub. No gallop.   Pulmonary:      Effort: Pulmonary effort is normal.      Breath sounds: Normal breath sounds. No wheezing or rhonchi.   Abdominal:      Tenderness: There is abdominal tenderness.      Comments: Abdomen appears somewhat bloated, patient mildly tender epigastrically, periumbilical tenderness noted   Skin:     General: Skin is warm and dry.   Neurological:      Mental Status: She is alert and oriented to person, place, and time.      Cranial Nerves: No cranial nerve deficit.   Psychiatric:         Mood and Affect: Mood and affect normal.         Behavior: Behavior normal.         Thought Content: Thought content normal.         Judgment: Judgment normal.          Result Review              No Images in the past 120 days found..     The above data has been reviewed by JOHN Alvarado 03/15/2023 14:12 EDT.          Patient Care Team:  Tina Michael APRN as PCP - General (Nurse Practitioner)    BMI is >= 25 and <30. (Overweight) The following options were offered after discussion;: exercise counseling/recommendations and nutrition counseling/recommendations       ASSESSMENT & PLAN    Diagnoses and all orders for this visit:    1. Abdominal bloating (Primary)  -     CBC w AUTO Differential; Future  -     Comprehensive metabolic panel; Future  -     CT Abdomen With Contrast; Future    2. Hypothyroidism, unspecified  type  Overview:  Well-controlled levothyroxine, continue current dose    Orders:  -     TSH+Free T4; Future  -     levothyroxine (SYNTHROID, LEVOTHROID) 125 MCG tablet; Take 1 tablet by mouth Daily. APPOINTMENT NEEDED FOR ADDITIONAL REFILLS  Dispense: 30 tablet; Refill: 2    3. Epigastric abdominal tenderness without rebound tenderness  -     CBC w AUTO Differential; Future  -     Comprehensive metabolic panel; Future  -     CT Abdomen With Contrast; Future    4. Dysfunction of left eustachian tube  -     methylPREDNISolone (MEDROL) 4 MG dose pack; Take as directed on package instructions.  Dispense: 1 each; Refill: 0    5. Periumbilic swelling, mass or lump  -     CT Abdomen With Contrast; Future       Tobacco Use: High Risk   • Smoking Tobacco Use: Every Day   • Smokeless Tobacco Use: Never   • Passive Exposure: Not on file       Follow Up     Return in about 6 months (around 9/15/2023), or if symptoms worsen or fail to improve.        Patient was given instructions and counseling regarding her condition or for health maintenance advice. Please see specific information pulled into the AVS if appropriate.   I have reviewed information obtained and documented by others and I have confirmed the accuracy of this documented note.    JOHN Alvarado

## 2023-03-16 DIAGNOSIS — E03.9 HYPOTHYROIDISM, UNSPECIFIED TYPE: Primary | ICD-10-CM

## 2023-03-16 DIAGNOSIS — R73.9 HYPERGLYCEMIA: Primary | ICD-10-CM

## 2023-03-16 LAB — HBA1C MFR BLD: 6.2 % (ref 4.8–5.6)

## 2023-03-17 LAB
FT4I SERPL CALC-MCNC: 3.2 (ref 1.2–4.9)
T3 SERPL-MCNC: 135 NG/DL (ref 71–180)
T3RU NFR SERPL: 27 % (ref 24–39)
T4 SERPL-MCNC: 11.9 UG/DL (ref 4.5–12)
TSH SERPL DL<=0.005 MIU/L-ACNC: 0.05 UIU/ML (ref 0.45–4.5)

## 2023-04-13 ENCOUNTER — HOSPITAL ENCOUNTER (OUTPATIENT)
Dept: CT IMAGING | Facility: HOSPITAL | Age: 58
Discharge: HOME | End: 2023-04-13
Admitting: NURSE PRACTITIONER
Payer: MEDICARE

## 2023-04-13 DIAGNOSIS — R19.05 PERIUMBILIC SWELLING, MASS OR LUMP: ICD-10-CM

## 2023-04-13 DIAGNOSIS — R10.816 EPIGASTRIC ABDOMINAL TENDERNESS WITHOUT REBOUND TENDERNESS: ICD-10-CM

## 2023-04-13 DIAGNOSIS — R14.0 ABDOMINAL BLOATING: ICD-10-CM

## 2023-04-13 PROCEDURE — 25510000001 IOPAMIDOL PER 1 ML: Performed by: NURSE PRACTITIONER

## 2023-04-13 PROCEDURE — 74160 CT ABDOMEN W/CONTRAST: CPT

## 2023-04-13 RX ADMIN — IOPAMIDOL 100 ML: 755 INJECTION, SOLUTION INTRAVENOUS at 15:59

## 2023-05-10 ENCOUNTER — LAB (OUTPATIENT)
Dept: LAB | Facility: HOSPITAL | Age: 58
End: 2023-05-10
Payer: MEDICARE

## 2023-05-10 DIAGNOSIS — E03.9 HYPOTHYROIDISM, UNSPECIFIED TYPE: ICD-10-CM

## 2023-05-10 LAB
T4 FREE SERPL-MCNC: 2.03 NG/DL (ref 0.93–1.7)
TSH SERPL DL<=0.05 MIU/L-ACNC: 0.03 UIU/ML (ref 0.27–4.2)

## 2023-05-10 PROCEDURE — 36415 COLL VENOUS BLD VENIPUNCTURE: CPT

## 2023-05-10 PROCEDURE — 84439 ASSAY OF FREE THYROXINE: CPT

## 2023-05-10 PROCEDURE — 84443 ASSAY THYROID STIM HORMONE: CPT

## 2023-05-11 DIAGNOSIS — E03.9 HYPOTHYROIDISM, UNSPECIFIED TYPE: Primary | ICD-10-CM

## 2023-05-11 RX ORDER — LEVOTHYROXINE SODIUM 112 UG/1
112 TABLET ORAL DAILY
Qty: 30 TABLET | Refills: 2 | Status: SHIPPED | OUTPATIENT
Start: 2023-05-11

## 2023-05-16 ENCOUNTER — TELEPHONE (OUTPATIENT)
Dept: FAMILY MEDICINE CLINIC | Facility: CLINIC | Age: 58
End: 2023-05-16
Payer: MEDICARE

## 2023-05-16 NOTE — TELEPHONE ENCOUNTER
Caller: Aida Puga    Relationship to patient: Self    Best call back number: [phone]    Patient is needing: PATIENT IS CALLING TO INQUIRE ABOUT HER LEVOTHYROXINE. SHE IS WONDERING WHY HER DOSAGE HAS BEEN DECREASED. PLEASE CALL TO ADVISE.

## 2023-06-15 ENCOUNTER — LAB (OUTPATIENT)
Dept: LAB | Facility: HOSPITAL | Age: 58
End: 2023-06-15
Payer: MEDICARE

## 2023-06-15 DIAGNOSIS — E03.9 HYPOTHYROIDISM, UNSPECIFIED TYPE: ICD-10-CM

## 2023-06-15 LAB
T4 FREE SERPL-MCNC: 1.82 NG/DL (ref 0.93–1.7)
TSH SERPL DL<=0.05 MIU/L-ACNC: 0.06 UIU/ML (ref 0.27–4.2)

## 2023-06-15 PROCEDURE — 84443 ASSAY THYROID STIM HORMONE: CPT

## 2023-06-15 PROCEDURE — 36415 COLL VENOUS BLD VENIPUNCTURE: CPT

## 2023-06-15 PROCEDURE — 84439 ASSAY OF FREE THYROXINE: CPT

## 2023-06-19 ENCOUNTER — OFFICE VISIT (OUTPATIENT)
Dept: FAMILY MEDICINE CLINIC | Facility: CLINIC | Age: 58
End: 2023-06-19
Payer: MEDICARE

## 2023-06-19 VITALS
WEIGHT: 152 LBS | BODY MASS INDEX: 25.95 KG/M2 | HEIGHT: 64 IN | SYSTOLIC BLOOD PRESSURE: 102 MMHG | RESPIRATION RATE: 17 BRPM | OXYGEN SATURATION: 96 % | DIASTOLIC BLOOD PRESSURE: 62 MMHG | HEART RATE: 95 BPM

## 2023-06-19 DIAGNOSIS — Z00.00 MEDICARE ANNUAL WELLNESS VISIT, SUBSEQUENT: Primary | ICD-10-CM

## 2023-06-19 DIAGNOSIS — F17.200 SMOKER: ICD-10-CM

## 2023-06-19 PROCEDURE — 1170F FXNL STATUS ASSESSED: CPT | Performed by: NURSE PRACTITIONER

## 2023-06-19 PROCEDURE — 96160 PT-FOCUSED HLTH RISK ASSMT: CPT | Performed by: NURSE PRACTITIONER

## 2023-06-19 PROCEDURE — G0439 PPPS, SUBSEQ VISIT: HCPCS | Performed by: NURSE PRACTITIONER

## 2023-06-19 NOTE — PROGRESS NOTES
The ABCs of the Annual Wellness Visit  Subsequent Medicare Wellness Visit    Subjective      Aida Puga is a 57 y.o. female who presents for a Subsequent Medicare Wellness Visit.    The following portions of the patient's history were reviewed and   updated as appropriate: allergies, current medications, past family history, past medical history, past social history, past surgical history, and problem list.    Compared to one year ago, the patient feels her physical   health is the same.    Compared to one year ago, the patient feels her mental   health is the same.    Recent Hospitalizations:  She was not admitted to the hospital during the last year.       Current Medical Providers:  Patient Care Team:  Tina Michael APRN as PCP - General (Nurse Practitioner)    Outpatient Medications Prior to Visit   Medication Sig Dispense Refill    levothyroxine (Synthroid) 100 MCG tablet Take 1 tablet by mouth Daily. 30 tablet 1    methylPREDNISolone (MEDROL) 4 MG dose pack Take as directed on package instructions. (Patient not taking: Reported on 6/19/2023) 1 each 0     No facility-administered medications prior to visit.       No opioid medication identified on active medication list. I have reviewed chart for other potential  high risk medication/s and harmful drug interactions in the elderly.        Aspirin is not on active medication list.  Aspirin use is not indicated based on review of current medical condition/s. Risk of harm outweighs potential benefits.  .    Patient Active Problem List   Diagnosis    Acute appendicitis with localized peritonitis, without perforation, abscess, or gangrene    Hypothyroidism     Advance Care Planning   Advance Care Planning     Advance Directive is not on file.  ACP discussion was held with the patient during this visit. Patient does not have an advance directive, declines further assistance.     Objective    Vitals:    06/19/23 1419   BP: 102/62   BP Location: Left arm  "  Patient Position: Sitting   Cuff Size: Adult   Pulse: 95   Resp: 17   SpO2: 96%   Weight: 68.9 kg (152 lb)   Height: 162.6 cm (64\")     Estimated body mass index is 26.09 kg/m² as calculated from the following:    Height as of this encounter: 162.6 cm (64\").    Weight as of this encounter: 68.9 kg (152 lb).           Does the patient have evidence of cognitive impairment?   No            HEALTH RISK ASSESSMENT    Smoking Status:  Social History     Tobacco Use   Smoking Status Every Day    Packs/day: 1.00    Types: Cigarettes   Smokeless Tobacco Never     Alcohol Consumption:  Social History     Substance and Sexual Activity   Alcohol Use Never     Fall Risk Screen:    MEGANADI Fall Risk Assessment was completed, and patient is at LOW risk for falls.Assessment completed on:2023    Depression Screenin/19/2023     2:15 PM   PHQ-2/PHQ-9 Depression Screening   Little Interest or Pleasure in Doing Things 0-->not at all   Feeling Down, Depressed or Hopeless 0-->not at all   PHQ-9: Brief Depression Severity Measure Score 0       Health Habits and Functional and Cognitive Screenin/19/2023     2:00 PM   Functional & Cognitive Status   Do you have difficulty preparing food and eating? No   Do you have difficulty bathing yourself, getting dressed or grooming yourself? No   Do you have difficulty using the toilet? No   Do you have difficulty moving around from place to place? No   Do you have trouble with steps or getting out of a bed or a chair? No   Current Diet Well Balanced Diet   Dental Exam Not up to date   Eye Exam Up to date   Exercise (times per week) 4 times per week   Current Exercises Include Other   Do you need help using the phone?  No   Are you deaf or do you have serious difficulty hearing?  No   Do you need help with transportation? No   Do you need help shopping? No   Do you need help preparing meals?  No   Do you need help with housework?  No   Do you need help with laundry? No   Do you " need help taking your medications? No   Do you need help managing money? No   Do you ever drive or ride in a car without wearing a seat belt? No   Have you felt unusual stress, anger or loneliness in the last month? No   Who do you live with? Spouse   If you need help, do you have trouble finding someone available to you? No   Do you have difficulty concentrating, remembering or making decisions? No       Age-appropriate Screening Schedule:  Refer to the list below for future screening recommendations based on patient's age, sex and/or medical conditions. Orders for these recommended tests are listed in the plan section. The patient has been provided with a written plan.    Health Maintenance   Topic Date Due    MAMMOGRAM  01/27/2018    HEPATITIS C SCREENING  Never done    ANNUAL WELLNESS VISIT  Never done    PAP SMEAR  Never done    COVID-19 Vaccine (1) 06/21/2023 (Originally 1/8/1966)    ZOSTER VACCINE (1 of 2) 09/15/2023 (Originally 7/8/2013)    Pneumococcal Vaccine 0-64 (1 - PCV) 06/19/2024 (Originally 7/8/1969)    TDAP/TD VACCINES (1 - Tdap) 06/19/2024 (Originally 7/8/1982)    INFLUENZA VACCINE  10/01/2023    COLORECTAL CANCER SCREENING  05/10/2025                  CMS Preventative Services Quick Reference  Risk Factors Identified During Encounter:    Tobacco Use/Dependance Risk   Aida Puga  reports that she has been smoking cigarettes. She has been smoking an average of 1 pack per day. She has never used smokeless tobacco.. I have educated her on the risk of diseases from using tobacco products such as cancer, COPD, and heart disease.     I advised her to quit and she is not willing to quit.    I spent 3  minutes counseling the patient.         The above risks/problems have been discussed with the patient.  Pertinent information has been shared with the patient in the After Visit Summary.    Diagnoses and all orders for this visit:    1. Medicare annual wellness visit, subsequent (Primary)    2.  Smoker  Comments:  Not ready to quit at present        Patient declines mammogram    Follow Up:   Next Medicare Wellness visit to be scheduled in 1 year.      An After Visit Summary and PPPS were made available to the patient.

## 2023-08-16 DIAGNOSIS — E03.9 HYPOTHYROIDISM, UNSPECIFIED TYPE: ICD-10-CM

## 2023-08-16 RX ORDER — LEVOTHYROXINE SODIUM 0.1 MG/1
100 TABLET ORAL DAILY
Qty: 30 TABLET | Refills: 0 | Status: SHIPPED | OUTPATIENT
Start: 2023-08-16

## 2023-08-16 NOTE — TELEPHONE ENCOUNTER
MAW 6/19/23  Acute OV 3/15/23  Cancelled 12/1/22, 11/14/22  Established care 5/10/22  Dose was decreased 6/15/23.  Hast not completed repeat TSH labs ordered 6/19/23.

## 2023-08-24 ENCOUNTER — TELEPHONE (OUTPATIENT)
Dept: FAMILY MEDICINE CLINIC | Facility: CLINIC | Age: 58
End: 2023-08-24
Payer: MEDICARE

## 2023-08-24 NOTE — TELEPHONE ENCOUNTER
Caller: Aida Puga    Relationship: Self    Best call back number: [phone]     What is the best time to reach you: ANY    Who are you requesting to speak with (clinical staff, provider,  specific staff member): CLINICAL    What was the call regarding: PATIENT STATED THAT SHE IS NEEDING TO DISCUSS WHAT LABS SHE IS TO HAVE DONE. SHE RECEIVED A MESSAGE IN HER [facility] THAT SHE IS OVER DUE FOR LABS.     Is it okay if the provider responds through [facility]: YES

## 2023-08-31 ENCOUNTER — LAB (OUTPATIENT)
Dept: LAB | Facility: HOSPITAL | Age: 58
End: 2023-08-31
Payer: MEDICARE

## 2023-08-31 DIAGNOSIS — E03.9 HYPOTHYROIDISM, UNSPECIFIED TYPE: ICD-10-CM

## 2023-08-31 LAB
T4 FREE SERPL-MCNC: 1.72 NG/DL (ref 0.93–1.7)
TSH SERPL DL<=0.05 MIU/L-ACNC: 0.16 UIU/ML (ref 0.27–4.2)

## 2023-08-31 PROCEDURE — 84443 ASSAY THYROID STIM HORMONE: CPT

## 2023-08-31 PROCEDURE — 36415 COLL VENOUS BLD VENIPUNCTURE: CPT

## 2023-08-31 PROCEDURE — 84439 ASSAY OF FREE THYROXINE: CPT

## 2023-09-05 DIAGNOSIS — E03.9 HYPOTHYROIDISM, UNSPECIFIED TYPE: Primary | ICD-10-CM

## 2023-09-05 RX ORDER — LEVOTHYROXINE SODIUM 88 UG/1
88 TABLET ORAL DAILY
Qty: 30 TABLET | Refills: 2 | Status: SHIPPED | OUTPATIENT
Start: 2023-09-05

## 2023-09-15 RX ORDER — LEVOTHYROXINE SODIUM 0.1 MG/1
TABLET ORAL
Qty: 30 TABLET | OUTPATIENT
Start: 2023-09-15

## 2023-09-19 ENCOUNTER — OFFICE VISIT (OUTPATIENT)
Dept: FAMILY MEDICINE CLINIC | Facility: CLINIC | Age: 58
End: 2023-09-19
Payer: MEDICARE

## 2023-09-19 VITALS
HEIGHT: 64 IN | HEART RATE: 64 BPM | WEIGHT: 148 LBS | SYSTOLIC BLOOD PRESSURE: 101 MMHG | OXYGEN SATURATION: 97 % | BODY MASS INDEX: 25.27 KG/M2 | DIASTOLIC BLOOD PRESSURE: 50 MMHG

## 2023-09-19 DIAGNOSIS — E03.9 HYPOTHYROIDISM, UNSPECIFIED TYPE: ICD-10-CM

## 2023-09-19 DIAGNOSIS — Z53.20 MAMMOGRAM DECLINED: Primary | ICD-10-CM

## 2023-09-19 DIAGNOSIS — Z53.20 COLON CANCER SCREENING DECLINED: ICD-10-CM

## 2023-09-19 NOTE — PROGRESS NOTES
"Chief Complaint  Hypothyroidism    SUBJECTIVE  Aida Puga presents to [facility] 6 month follow up     Pt reports no problems or concerns at this time       Pt is declining mammogram and colon screenings. Pt is unsure of last pap, pt declines to schedule.     History of Present Illness  Past Medical History:   Diagnosis Date    Disease of thyroid gland       Family History   Problem Relation Age of Onset    Lung cancer Mother     Cancer Father       Past Surgical History:   Procedure Laterality Date    APPENDECTOMY N/A 8/14/2021    Procedure: APPENDECTOMY LAPAROSCOPIC POSSIBLE OPEN;  Surgeon: Ilan Fajardo MD;  Location: [facility] MAIN OR;  Service: General;  Laterality: N/A;    CHOLECYSTECTOMY      TUBAL ABDOMINAL LIGATION          Current Outpatient Medications:     levothyroxine (Synthroid) 88 MCG tablet, Take 1 tablet by mouth Daily., Disp: 30 tablet, Rfl: 2    OBJECTIVE  Vital Signs:   /50   Pulse 64   Ht 162.6 cm (64\")   Wt 67.1 kg (148 lb)   SpO2 97%   BMI 25.40 kg/m²    Estimated body mass index is 25.4 kg/m² as calculated from the following:    Height as of this encounter: 162.6 cm (64\").    Weight as of this encounter: 67.1 kg (148 lb).     Wt Readings from Last 3 Encounters:   09/19/23 67.1 kg (148 lb)   06/19/23 68.9 kg (152 lb)   03/15/23 71.7 kg (158 lb)     BP Readings from Last 3 Encounters:   09/19/23 101/50   06/19/23 102/62   03/15/23 111/63       Physical Exam  Vitals reviewed.   Constitutional:       Appearance: Normal appearance. She is well-developed.   HENT:      Head: Normocephalic and atraumatic.      Right Ear: External ear normal.      Left Ear: External ear normal.   Eyes:      Conjunctiva/sclera: Conjunctivae normal.      Pupils: Pupils are equal, round, and reactive to light.   Cardiovascular:      Rate and Rhythm: Normal rate and regular rhythm.      Heart sounds: No murmur heard.    No friction rub. No gallop.   Pulmonary: "      Effort: Pulmonary effort is normal.      Breath sounds: Normal breath sounds. No wheezing or rhonchi.   Skin:     General: Skin is warm and dry.   Neurological:      Mental Status: She is alert and oriented to person, place, and time.      Cranial Nerves: No cranial nerve deficit.   Psychiatric:         Mood and Affect: Mood and affect normal.         Behavior: Behavior normal.         Thought Content: Thought content normal.         Judgment: Judgment normal.        Result Review    CMP          3/15/2023    14:56   CMP   Glucose 106    BUN 11    Creatinine 0.84    EGFR 80.2    Sodium 141    Potassium 4.5    Chloride 104    Calcium 9.3    Total Protein 7.3    Albumin 4.5    Globulin 2.8    Total Bilirubin <0.2    Alkaline Phosphatase 68    AST (SGOT) 16    ALT (SGPT) 18    Albumin/Globulin Ratio 1.6    BUN/Creatinine Ratio 13.1    Anion Gap 11.0      CBC          3/15/2023    14:56   CBC   WBC 10.20    RBC 4.59    Hemoglobin 14.1    Hematocrit 41.2    MCV 89.8    MCH 30.7    MCHC 34.2    RDW 12.8    Platelets 285        TSH          5/10/2023    08:08 6/15/2023    08:41 8/31/2023    08:27   TSH   TSH 0.034  0.058  0.156      Most Recent A1C          3/15/2023    14:56   HGBA1C Most Recent   Hemoglobin A1C 6.20            Lab Results   Component Value Date    FREET4 1.72 (H) 08/31/2023          No Images in the past 120 days found..     The above data has been reviewed by JOHN Alvarado 09/19/2023 14:40 EDT.          Patient Care Team:  Tina Michael APRN as PCP - General (Nurse Practitioner)            ASSESSMENT & PLAN    Diagnoses and all orders for this visit:    1. Mammogram declined (Primary)    2. Hypothyroidism, unspecified type  Overview:  Dose decreased recently due to overplacement patient reports feeling better with dose change, lab order already placed to recheck in 6 weeks.  Continue current dose pending results      3. Colon cancer screening declined       Patient fully understands  the risks of declining breast and colon cancer screenings.      Tobacco Use: High Risk    Smoking Tobacco Use: Every Day    Smokeless Tobacco Use: Never    Passive Exposure: Not on file       Follow Up     Return in about 6 months (around 3/19/2024), or if symptoms worsen or fail to improve.        Patient was given instructions and counseling regarding her condition or for health maintenance advice. Please see specific information pulled into the AVS if appropriate.   I have reviewed information obtained and documented by others and I have confirmed the accuracy of this documented note.    Tina Michael, APRN

## 2023-12-01 DIAGNOSIS — E03.9 HYPOTHYROIDISM, UNSPECIFIED TYPE: ICD-10-CM

## 2023-12-04 RX ORDER — LEVOTHYROXINE SODIUM 88 UG/1
88 TABLET ORAL DAILY
Qty: 90 TABLET | Refills: 0 | Status: SHIPPED | OUTPATIENT
Start: 2023-12-04

## 2024-02-29 DIAGNOSIS — E03.9 HYPOTHYROIDISM, UNSPECIFIED TYPE: ICD-10-CM

## 2024-02-29 RX ORDER — LEVOTHYROXINE SODIUM 88 UG/1
88 TABLET ORAL DAILY
Qty: 30 TABLET | Refills: 1 | Status: SHIPPED | OUTPATIENT
Start: 2024-02-29

## 2024-04-27 DIAGNOSIS — E03.9 HYPOTHYROIDISM, UNSPECIFIED TYPE: ICD-10-CM

## 2024-04-29 RX ORDER — LEVOTHYROXINE SODIUM 88 UG/1
88 TABLET ORAL DAILY
Qty: 30 TABLET | Refills: 1 | Status: SHIPPED | OUTPATIENT
Start: 2024-04-29

## 2024-05-02 ENCOUNTER — OFFICE VISIT (OUTPATIENT)
Dept: FAMILY MEDICINE CLINIC | Facility: CLINIC | Age: 59
End: 2024-05-02
Payer: MEDICARE

## 2024-05-02 ENCOUNTER — LAB (OUTPATIENT)
Dept: LAB | Facility: HOSPITAL | Age: 59
End: 2024-05-02
Payer: MEDICARE

## 2024-05-02 VITALS
WEIGHT: 161.6 LBS | DIASTOLIC BLOOD PRESSURE: 69 MMHG | OXYGEN SATURATION: 99 % | BODY MASS INDEX: 27.59 KG/M2 | SYSTOLIC BLOOD PRESSURE: 122 MMHG | HEART RATE: 67 BPM | HEIGHT: 64 IN

## 2024-05-02 DIAGNOSIS — R73.03 PREDIABETES: ICD-10-CM

## 2024-05-02 DIAGNOSIS — E03.9 HYPOTHYROIDISM, UNSPECIFIED TYPE: ICD-10-CM

## 2024-05-02 DIAGNOSIS — Z00.00 ENCOUNTER FOR MEDICARE ANNUAL WELLNESS EXAM: Primary | ICD-10-CM

## 2024-05-02 DIAGNOSIS — Z00.00 ENCOUNTER FOR MEDICARE ANNUAL WELLNESS EXAM: ICD-10-CM

## 2024-05-02 DIAGNOSIS — Z13.220 LIPID SCREENING: ICD-10-CM

## 2024-05-02 LAB
ALBUMIN SERPL-MCNC: 4.6 G/DL (ref 3.5–5.2)
ALBUMIN/GLOB SERPL: 1.7 G/DL
ALP SERPL-CCNC: 63 U/L (ref 39–117)
ALT SERPL W P-5'-P-CCNC: 15 U/L (ref 1–33)
ANION GAP SERPL CALCULATED.3IONS-SCNC: 8.5 MMOL/L (ref 5–15)
AST SERPL-CCNC: 15 U/L (ref 1–32)
BASOPHILS # BLD AUTO: 0.1 10*3/MM3 (ref 0–0.2)
BASOPHILS NFR BLD AUTO: 1.1 % (ref 0–1.5)
BILIRUB SERPL-MCNC: <0.2 MG/DL (ref 0–1.2)
BUN SERPL-MCNC: 10 MG/DL (ref 8–23)
BUN/CREAT SERPL: 14.1 (ref 7–25)
CALCIUM SPEC-SCNC: 9.4 MG/DL (ref 8.6–10.5)
CHLORIDE SERPL-SCNC: 106 MMOL/L (ref 98–107)
CHOLEST SERPL-MCNC: 224 MG/DL (ref 0–200)
CO2 SERPL-SCNC: 26.5 MMOL/L (ref 22–29)
CREAT SERPL-MCNC: 0.71 MG/DL (ref 0.57–1)
DEPRECATED RDW RBC AUTO: 40.8 FL (ref 37–54)
EGFRCR SERPLBLD CKD-EPI 2021: 97.5 ML/MIN/1.73
EOSINOPHIL # BLD AUTO: 0.32 10*3/MM3 (ref 0–0.4)
EOSINOPHIL NFR BLD AUTO: 3.6 % (ref 0.3–6.2)
ERYTHROCYTE [DISTWIDTH] IN BLOOD BY AUTOMATED COUNT: 12.5 % (ref 12.3–15.4)
GLOBULIN UR ELPH-MCNC: 2.7 GM/DL
GLUCOSE SERPL-MCNC: 108 MG/DL (ref 65–99)
HBA1C MFR BLD: 6 % (ref 4.8–5.6)
HCT VFR BLD AUTO: 42.1 % (ref 34–46.6)
HDLC SERPL-MCNC: 58 MG/DL (ref 40–60)
HGB BLD-MCNC: 14 G/DL (ref 12–15.9)
IMM GRANULOCYTES # BLD AUTO: 0.04 10*3/MM3 (ref 0–0.05)
IMM GRANULOCYTES NFR BLD AUTO: 0.5 % (ref 0–0.5)
LDLC SERPL CALC-MCNC: 153 MG/DL (ref 0–100)
LDLC/HDLC SERPL: 2.6 {RATIO}
LYMPHOCYTES # BLD AUTO: 2.88 10*3/MM3 (ref 0.7–3.1)
LYMPHOCYTES NFR BLD AUTO: 32.7 % (ref 19.6–45.3)
MCH RBC QN AUTO: 30.3 PG (ref 26.6–33)
MCHC RBC AUTO-ENTMCNC: 33.3 G/DL (ref 31.5–35.7)
MCV RBC AUTO: 91.1 FL (ref 79–97)
MONOCYTES # BLD AUTO: 0.61 10*3/MM3 (ref 0.1–0.9)
MONOCYTES NFR BLD AUTO: 6.9 % (ref 5–12)
NEUTROPHILS NFR BLD AUTO: 4.85 10*3/MM3 (ref 1.7–7)
NEUTROPHILS NFR BLD AUTO: 55.2 % (ref 42.7–76)
NRBC BLD AUTO-RTO: 0 /100 WBC (ref 0–0.2)
PLATELET # BLD AUTO: 247 10*3/MM3 (ref 140–450)
PMV BLD AUTO: 10.1 FL (ref 6–12)
POTASSIUM SERPL-SCNC: 5.1 MMOL/L (ref 3.5–5.2)
PROT SERPL-MCNC: 7.3 G/DL (ref 6–8.5)
RBC # BLD AUTO: 4.62 10*6/MM3 (ref 3.77–5.28)
SODIUM SERPL-SCNC: 141 MMOL/L (ref 136–145)
T4 FREE SERPL-MCNC: 1.45 NG/DL (ref 0.92–1.68)
TRIGL SERPL-MCNC: 75 MG/DL (ref 0–150)
TSH SERPL DL<=0.05 MIU/L-ACNC: 2.76 UIU/ML (ref 0.27–4.2)
VLDLC SERPL-MCNC: 13 MG/DL (ref 5–40)
WBC NRBC COR # BLD AUTO: 8.8 10*3/MM3 (ref 3.4–10.8)

## 2024-05-02 PROCEDURE — 85025 COMPLETE CBC W/AUTO DIFF WBC: CPT

## 2024-05-02 PROCEDURE — 36415 COLL VENOUS BLD VENIPUNCTURE: CPT

## 2024-05-02 PROCEDURE — 80061 LIPID PANEL: CPT

## 2024-05-02 PROCEDURE — 80053 COMPREHEN METABOLIC PANEL: CPT

## 2024-05-02 PROCEDURE — 84439 ASSAY OF FREE THYROXINE: CPT

## 2024-05-02 PROCEDURE — 83036 HEMOGLOBIN GLYCOSYLATED A1C: CPT

## 2024-05-02 PROCEDURE — 84443 ASSAY THYROID STIM HORMONE: CPT

## 2024-05-02 NOTE — PROGRESS NOTES
"Chief Complaint  Follow-up, Results, and Annual Exam    SUBJECTIVE  Aida Puga presents to [facility] follow up on thyroid      Annual Exam.     Pt is also concerned about her weight and being very fatigued with no energy.     History of Present Illness  Past Medical History:   Diagnosis Date    Disease of thyroid gland       Family History   Problem Relation Age of Onset    Lung cancer Mother     Cancer Father       Past Surgical History:   Procedure Laterality Date    APPENDECTOMY N/A 8/14/2021    Procedure: APPENDECTOMY LAPAROSCOPIC POSSIBLE OPEN;  Surgeon: Ilan Fajardo MD;  Location: [facility] MAIN OR;  Service: General;  Laterality: N/A;    CHOLECYSTECTOMY      TUBAL ABDOMINAL LIGATION          Current Outpatient Medications:     levothyroxine (SYNTHROID, LEVOTHROID) 88 MCG tablet, TAKE 1 TABLET BY MOUTH DAILY, Disp: 30 tablet, Rfl: 1    OBJECTIVE  Vital Signs:   /69   Pulse 67   Ht 162.6 cm (64\")   Wt 73.3 kg (161 lb 9.6 oz)   SpO2 99%   BMI 27.74 kg/m²    Estimated body mass index is 27.74 kg/m² as calculated from the following:    Height as of this encounter: 162.6 cm (64\").    Weight as of this encounter: 73.3 kg (161 lb 9.6 oz).     Wt Readings from Last 3 Encounters:   05/02/24 73.3 kg (161 lb 9.6 oz)   09/19/23 67.1 kg (148 lb)   06/19/23 68.9 kg (152 lb)     BP Readings from Last 3 Encounters:   05/02/24 122/69   09/19/23 101/50   06/19/23 102/62       Physical Exam     Result Review    {[facility] Labs (Optional):82348}    No Images in the past 120 days found..     The above data has been reviewed by JOHN Alvarado 05/02/2024 07:40 EDT.          Patient Care Team:  Tina Michael APRN as PCP - General (Nurse Practitioner)    {BMI is >= 25 and <30. (Overweight) The following options were offered after discussion;:[id number]}       ASSESSMENT & PLAN    Diagnoses and all orders for this visit:    1. Hypothyroidism, " unspecified type  Overview:  Dose decreased recently due to overplacement patient reports feeling better with dose change, lab order already placed to recheck in 6 weeks.  Continue current dose pending results           Tobacco Use: High Risk (5/2/2024)    Patient History     Smoking Tobacco Use: Every Day     Smokeless Tobacco Use: Never     Passive Exposure: Not on file       Follow Up     No follow-ups on file.    {Time Spent (Optional):08527}    Patient was given instructions and counseling regarding her condition or for health maintenance advice. Please see specific information pulled into the AVS if appropriate.   I have reviewed information obtained and documented by others and I have confirmed the accuracy of this documented note.    Tina Michael, APRN

## 2024-05-02 NOTE — PROGRESS NOTES
"The ABCs of the Annual Wellness Visit  Subsequent Medicare Wellness Visit    Subjective    Aida Puga is a 58 y.o. female who presents for a Subsequent Medicare Wellness Visit.    The following portions of the patient's history were reviewed and   updated as appropriate: allergies, current medications, past family history, past medical history, past social history, past surgical history, and problem list.    Compared to one year ago, the patient feels her physical   health is the same.    Compared to one year ago, the patient feels her mental   health is the same.    Recent Hospitalizations:  She was not admitted to the hospital during the last year.       Current Medical Providers:  Patient Care Team:  Tina Michael APRN as PCP - General (Nurse Practitioner)    Outpatient Medications Prior to Visit   Medication Sig Dispense Refill    levothyroxine (SYNTHROID, LEVOTHROID) 88 MCG tablet TAKE 1 TABLET BY MOUTH DAILY 30 tablet 1     No facility-administered medications prior to visit.       No opioid medication identified on active medication list. I have reviewed chart for other potential  high risk medication/s and harmful drug interactions in the elderly.        Aspirin is not on active medication list.  Aspirin use is not indicated based on review of current medical condition/s. Risk of harm outweighs potential benefits.  .    Patient Active Problem List   Diagnosis    Acute appendicitis with localized peritonitis, without perforation, abscess, or gangrene    Hypothyroidism    Prediabetes     Advance Care Planning   Advance Care Planning     Advance Directive is not on file.  ACP discussion was declined by the patient. Patient does not have an advance directive, declines further assistance.     Objective    Vitals:    05/02/24 0741   BP: 122/69   Pulse: 67   SpO2: 99%   Weight: 73.3 kg (161 lb 9.6 oz)   Height: 162.6 cm (64\")   PainSc: 0-No pain     Estimated body mass index is 27.74 kg/m² as " "calculated from the following:    Height as of this encounter: 162.6 cm (64\").    Weight as of this encounter: 73.3 kg (161 lb 9.6 oz).    BMI is >= 25 and <30. (Overweight) The following options were offered after discussion;: exercise counseling/recommendations and nutrition counseling/recommendations      Does the patient have evidence of cognitive impairment? No    Lab Results   Component Value Date    TRIG 75 2024    HDL 58 2024     (H) 2024    VLDL 13 2024    HGBA1C 6.00 (H) 2024        HEALTH RISK ASSESSMENT    Smoking Status:  Social History     Tobacco Use   Smoking Status Every Day    Current packs/day: 1.00    Types: Cigarettes   Smokeless Tobacco Never     Alcohol Consumption:  Social History     Substance and Sexual Activity   Alcohol Use Never     Fall Risk Screen:    RYAN Fall Risk Assessment was completed, and patient is at LOW risk for falls.Assessment completed on:2024    Depression Screenin/2/2024     8:16 AM   PHQ-2/PHQ-9 Depression Screening   Little Interest or Pleasure in Doing Things 0-->not at all   Feeling Down, Depressed or Hopeless 0-->not at all   PHQ-9: Brief Depression Severity Measure Score 0       Health Habits and Functional and Cognitive Screenin/2/2024     8:00 AM   Functional & Cognitive Status   Do you have difficulty preparing food and eating? No   Do you have difficulty bathing yourself, getting dressed or grooming yourself? No   Do you have difficulty using the toilet? No   Do you have difficulty moving around from place to place? No   Do you have trouble with steps or getting out of a bed or a chair? No   Current Diet Well Balanced Diet   Dental Exam Not up to date   Eye Exam Up to date   Exercise (times per week) 5 times per week   Current Exercises Include Yard Work   Do you need help using the phone?  No   Are you deaf or do you have serious difficulty hearing?  No   Do you need help to go to places out of " walking distance? No   Do you need help shopping? No   Do you need help preparing meals?  No   Do you need help with housework?  No   Do you need help with laundry? No   Do you need help taking your medications? No   Do you need help managing money? No   Do you ever drive or ride in a car without wearing a seat belt? No   Have you felt unusual stress, anger or loneliness in the last month? No   Who do you live with? Spouse   If you need help, do you have trouble finding someone available to you? No   Have you been bothered in the last four weeks by sexual problems? No   Do you have difficulty concentrating, remembering or making decisions? No       Age-appropriate Screening Schedule:  Refer to the list below for future screening recommendations based on patient's age, sex and/or medical conditions. Orders for these recommended tests are listed in the plan section. The patient has been provided with a written plan.    Health Maintenance   Topic Date Due    ZOSTER VACCINE (1 of 2) Never done    HEPATITIS C SCREENING  Never done    RSV Vaccine - Adults (1 - 1-dose 60+ series) Never done    COVID-19 Vaccine (1 - 2023-24 season) Never done    Pneumococcal Vaccine 0-64 (1 of 2 - PCV) 06/19/2024 (Originally 7/8/1971)    TDAP/TD VACCINES (1 - Tdap) 06/19/2024 (Originally 7/8/1982)    MAMMOGRAM  09/19/2024 (Originally 1/27/2018)    PAP SMEAR  09/19/2024 (Originally 7/2/2021)    INFLUENZA VACCINE  08/01/2024    ANNUAL WELLNESS VISIT  05/02/2025    BMI FOLLOWUP  05/02/2025    COLORECTAL CANCER SCREENING  05/10/2025                  CMS Preventative Services Quick Reference  Risk Factors Identified During Encounter  None Identified  The above risks/problems have been discussed with the patient.  Pertinent information has been shared with the patient in the After Visit Summary.  An After Visit Summary and PPPS were made available to the patient.    The patient is advised to quit smoking, follow healthy diet and exercise,  "maintain a healthy weight, continue current medications, and return for routine annual checkups.      Follow Up:   Next Medicare Wellness visit to be scheduled in 1 year.       Additional E&M Note during same encounter follows:  Patient has multiple medical problems which are significant and separately identifiable that require additional work above and beyond the Medicare Wellness Visit.      Chief Complaint  Medicare annual wellness, hypothyroidism, prediabetes    Subjective        HPI  Aida Puga is also being seen today for follow-up on hypothyroidism and prediabetes.         Objective   Vital Signs:  /69   Pulse 67   Ht 162.6 cm (64\")   Wt 73.3 kg (161 lb 9.6 oz)   SpO2 99%   BMI 27.74 kg/m²     Physical Exam  Vitals reviewed.   Constitutional:       Appearance: Normal appearance. She is well-developed.   HENT:      Head: Normocephalic and atraumatic.      Right Ear: External ear normal.      Left Ear: External ear normal.   Eyes:      Conjunctiva/sclera: Conjunctivae normal.      Pupils: Pupils are equal, round, and reactive to light.   Cardiovascular:      Rate and Rhythm: Normal rate and regular rhythm.      Heart sounds: No murmur heard.     No friction rub. No gallop.   Pulmonary:      Effort: Pulmonary effort is normal.      Breath sounds: Normal breath sounds. No wheezing or rhonchi.   Skin:     General: Skin is warm and dry.   Neurological:      Mental Status: She is alert and oriented to person, place, and time.      Cranial Nerves: No cranial nerve deficit.   Psychiatric:         Mood and Affect: Mood and affect normal.         Behavior: Behavior normal.         Thought Content: Thought content normal.         Judgment: Judgment normal.          The following data was reviewed by: JOHN Alvarado on 05/02/2024:  CMP          5/2/2024    08:39   CMP   Glucose 108    BUN 10    Creatinine 0.71    EGFR 97.5    Sodium 141    Potassium 5.1    Chloride 106    Calcium 9.4    Total " Protein 7.3    Albumin 4.6    Globulin 2.7    Total Bilirubin <0.2    Alkaline Phosphatase 63    AST (SGOT) 15    ALT (SGPT) 15    Albumin/Globulin Ratio 1.7    BUN/Creatinine Ratio 14.1    Anion Gap 8.5      CBC          5/2/2024    08:39   CBC   WBC 8.80    RBC 4.62    Hemoglobin 14.0    Hematocrit 42.1    MCV 91.1    MCH 30.3    MCHC 33.3    RDW 12.5    Platelets 247      Lipid Panel          5/2/2024    08:39   Lipid Panel   Total Cholesterol 224    Triglycerides 75    HDL Cholesterol 58    VLDL Cholesterol 13    LDL Cholesterol  153    LDL/HDL Ratio 2.60      TSH          6/15/2023    08:41 8/31/2023    08:27 5/2/2024    08:39   TSH   TSH 0.058  0.156  2.760      Most Recent A1C          5/2/2024    08:39   HGBA1C Most Recent   Hemoglobin A1C 6.00        A1C Last 3 Results          5/2/2024    08:39   HGBA1C Last 3 Results   Hemoglobin A1C 6.00      HCT          5/2/2024    08:39   HGT   Hematocrit 42.1                 Assessment and Plan   Diagnoses and all orders for this visit:    1. Encounter for Medicare annual wellness exam (Primary)  -     Hemoglobin A1c; Future  -     TSH+Free T4; Future  -     Comprehensive Metabolic Panel; Future  -     CBC & Differential; Future  -     Lipid Panel; Future    2. Hypothyroidism, unspecified type  Assessment & Plan:  Most recent labs show over replacement, dose was reduced, patient feeling well, we will recheck labs today     Orders:  -     Hemoglobin A1c; Future  -     TSH+Free T4; Future  -     Comprehensive Metabolic Panel; Future  -     CBC & Differential; Future  -     Lipid Panel; Future    3. Prediabetes  Assessment & Plan:  Diet and exercise discussed    Orders:  -     Hemoglobin A1c; Future    4. Lipid screening  -     Lipid Panel; Future      Patient declines mammogram       Follow Up   Return if symptoms worsen or fail to improve.  Patient was given instructions and counseling regarding her condition or for health maintenance advice. Please see specific  information pulled into the AVS if appropriate.

## 2024-05-21 NOTE — ASSESSMENT & PLAN NOTE
Most recent labs show over replacement, dose was reduced, patient feeling well, we will recheck labs today

## 2024-06-27 DIAGNOSIS — E03.9 HYPOTHYROIDISM, UNSPECIFIED TYPE: ICD-10-CM

## 2024-06-27 RX ORDER — LEVOTHYROXINE SODIUM 88 UG/1
88 TABLET ORAL DAILY
Qty: 30 TABLET | Refills: 1 | Status: SHIPPED | OUTPATIENT
Start: 2024-06-27

## 2024-08-28 DIAGNOSIS — E03.9 HYPOTHYROIDISM, UNSPECIFIED TYPE: ICD-10-CM

## 2024-08-29 RX ORDER — LEVOTHYROXINE SODIUM 88 UG/1
88 TABLET ORAL DAILY
Qty: 30 TABLET | Refills: 1 | Status: SHIPPED | OUTPATIENT
Start: 2024-08-29

## 2024-10-25 DIAGNOSIS — E03.9 HYPOTHYROIDISM, UNSPECIFIED TYPE: ICD-10-CM

## 2024-10-25 RX ORDER — LEVOTHYROXINE SODIUM 88 UG/1
88 TABLET ORAL DAILY
Qty: 30 TABLET | Refills: 0 | Status: SHIPPED | OUTPATIENT
Start: 2024-10-25

## (undated) DEVICE — ENDOPATH XCEL WITH OPTIVIEW TECHNOLOGY BLADELESS TROCARS WITH STABILITY SLEEVES: Brand: ENDOPATH XCEL OPTIVIEW

## (undated) DEVICE — GENERAL LAPAROSCOPY-LF: Brand: MEDLINE INDUSTRIES, INC.

## (undated) DEVICE — TROCARS: Brand: KII® BALLOON BLUNT TIP SYSTEM

## (undated) DEVICE — ADHS SKIN PREMIERPRO EXOFIN TOPICAL HI/VISC .5ML

## (undated) DEVICE — ECHELON FLEX POWERED PLUS ARTICULATING ENDOSCOPIC LINEAR CUTTER , 60MM: Brand: ECHELON FLEX

## (undated) DEVICE — ENDOPATH XCEL WITH OPTIVIEW TECHNOLOGY UNIVERSAL TROCAR STABILITY SLEEVES: Brand: ENDOPATH XCEL OPTIVIEW

## (undated) DEVICE — CALIB SPEC IGE PHADIA 5000/2500 PK/5

## (undated) DEVICE — SLV SCD LEG COMFORT KENDALLSCD MD REPROC

## (undated) DEVICE — SUT VIC 0 UR6 27IN VCP603H

## (undated) DEVICE — PENCL E/S SMOKEEVAC W/TELESCP CANN

## (undated) DEVICE — 2, DISPOSABLE SUCTION/IRRIGATOR WITHOUT DISPOSABLE TIP: Brand: STRYKEFLOW

## (undated) DEVICE — SUT MNCRYL 4/0 PS2 18 IN

## (undated) DEVICE — GLV SURG SENSICARE SLT PF LF 7.5 STRL

## (undated) DEVICE — RETRACTABLE L-HOOK LAPAROSCOPIC SEALER/DIVIDER: Brand: LIGASURE

## (undated) DEVICE — SYR LUERLOK 30CC

## (undated) DEVICE — SUT VIC PLS CTD BR 0 TIE 18IN VIL

## (undated) DEVICE — VISUALIZATION SYSTEM: Brand: CLEARIFY

## (undated) DEVICE — TISSUE RETRIEVAL SYSTEM: Brand: INZII RETRIEVAL SYSTEM

## (undated) DEVICE — 3M™ IOBAN™ 2 ANTIMICROBIAL INCISE DRAPE 6650EZ: Brand: IOBAN™ 2